# Patient Record
Sex: FEMALE | Race: WHITE | NOT HISPANIC OR LATINO | Employment: OTHER | URBAN - METROPOLITAN AREA
[De-identification: names, ages, dates, MRNs, and addresses within clinical notes are randomized per-mention and may not be internally consistent; named-entity substitution may affect disease eponyms.]

---

## 2017-03-08 PROBLEM — M50.30 DEGENERATIVE DISC DISEASE, CERVICAL: Status: ACTIVE | Noted: 2017-03-08

## 2017-03-08 PROBLEM — J30.89 PERENNIAL ALLERGIC RHINITIS: Status: ACTIVE | Noted: 2017-03-08

## 2017-04-28 PROBLEM — I95.1 ORTHOSTASIS: Status: ACTIVE | Noted: 2017-04-28

## 2017-04-28 PROBLEM — N39.0 UTI (URINARY TRACT INFECTION): Status: ACTIVE | Noted: 2017-04-28

## 2017-04-28 PROBLEM — E86.0 DEHYDRATION: Status: ACTIVE | Noted: 2017-04-28

## 2017-04-28 PROBLEM — R55 SYNCOPE AND COLLAPSE: Status: ACTIVE | Noted: 2017-04-28

## 2017-04-28 PROBLEM — R94.31 PROLONGED Q-T INTERVAL ON ECG: Status: ACTIVE | Noted: 2017-04-28

## 2017-04-28 PROBLEM — E87.1 HYPONATREMIA: Status: ACTIVE | Noted: 2017-04-28

## 2017-04-29 PROBLEM — R00.1 BRADYCARDIA, SINUS: Status: ACTIVE | Noted: 2017-04-29

## 2017-05-05 PROBLEM — E86.0 DEHYDRATION: Status: RESOLVED | Noted: 2017-04-28 | Resolved: 2017-05-05

## 2017-05-05 PROBLEM — N39.0 UTI (URINARY TRACT INFECTION): Status: RESOLVED | Noted: 2017-04-28 | Resolved: 2017-05-05

## 2017-05-05 PROBLEM — R55 SYNCOPE AND COLLAPSE: Status: RESOLVED | Noted: 2017-04-28 | Resolved: 2017-05-05

## 2018-05-16 PROBLEM — D50.8 IRON DEFICIENCY ANEMIA SECONDARY TO INADEQUATE DIETARY IRON INTAKE: Status: ACTIVE | Noted: 2018-05-16

## 2018-05-18 PROBLEM — Z78.0 POSTMENOPAUSAL: Status: ACTIVE | Noted: 2018-05-18

## 2018-11-14 ENCOUNTER — OFFICE VISIT (OUTPATIENT)
Dept: URGENT CARE | Facility: CLINIC | Age: 78
End: 2018-11-14
Payer: MEDICARE

## 2018-11-14 VITALS
SYSTOLIC BLOOD PRESSURE: 147 MMHG | BODY MASS INDEX: 29.64 KG/M2 | TEMPERATURE: 97 F | OXYGEN SATURATION: 97 % | DIASTOLIC BLOOD PRESSURE: 82 MMHG | WEIGHT: 157 LBS | HEIGHT: 61 IN | HEART RATE: 70 BPM | RESPIRATION RATE: 16 BRPM

## 2018-11-14 DIAGNOSIS — S91.119A LACERATION OF SKIN OF TOE, INITIAL ENCOUNTER: Primary | ICD-10-CM

## 2018-11-14 PROCEDURE — 3079F DIAST BP 80-89 MM HG: CPT | Mod: S$GLB,,, | Performed by: FAMILY MEDICINE

## 2018-11-14 PROCEDURE — 3077F SYST BP >= 140 MM HG: CPT | Mod: S$GLB,,, | Performed by: FAMILY MEDICINE

## 2018-11-14 PROCEDURE — 1101F PT FALLS ASSESS-DOCD LE1/YR: CPT | Mod: S$GLB,,, | Performed by: FAMILY MEDICINE

## 2018-11-14 PROCEDURE — 99214 OFFICE O/P EST MOD 30 MIN: CPT | Mod: S$GLB,,, | Performed by: FAMILY MEDICINE

## 2018-11-14 RX ORDER — MUPIROCIN 20 MG/G
OINTMENT TOPICAL
Qty: 22 G | Refills: 1 | Status: SHIPPED | OUTPATIENT
Start: 2018-11-14 | End: 2019-02-01

## 2018-11-14 NOTE — PROCEDURES
Laceration Repair  Date/Time: 11/14/2018 11:15 AM  Performed by: Sergio Howell MD  Authorized by: Sergio Howell MD   Foreign body present: pet hair.  Irrigation solution: saline  Irrigation method: syringe  Amount of cleaning: standard  Skin closure: Steri-Strips  Approximation difficulty: simple  Dressing: 4x4 sterile gauze, Steri-Strips, antibiotic ointment and non-stick sterile dressing  Patient tolerance: Patient tolerated the procedure well with no immediate complications

## 2018-11-14 NOTE — PROGRESS NOTES
"Subjective:       Patient ID: Nidia Alston is a 77 y.o. female.    Vitals:  height is 5' 1" (1.549 m) and weight is 71.2 kg (157 lb). Her temperature is 97.3 °F (36.3 °C). Her blood pressure is 147/82 (abnormal) and her pulse is 70. Her respiration is 16 and oxygen saturation is 97%.     Chief Complaint: Laceration    Pt states she fell this morning and injured her big toe on right foot.      Laceration    The incident occurred 1 to 3 hours ago. The laceration is located on the left foot.     Review of Systems   Constitution: Negative for weakness and malaise/fatigue.   HENT: Negative for nosebleeds.    Cardiovascular: Negative for chest pain and syncope.   Respiratory: Negative for shortness of breath.    Musculoskeletal: Positive for joint pain. Negative for back pain and neck pain.   Gastrointestinal: Negative for abdominal pain.   Genitourinary: Negative for hematuria.   Neurological: Negative for dizziness and numbness.       Objective:      Physical Exam   Constitutional: She is oriented to person, place, and time. She appears well-developed and well-nourished. She is cooperative.  Non-toxic appearance. She does not appear ill. No distress.   HENT:   Head: Normocephalic and atraumatic. Head is without abrasion, without contusion and without laceration.   Right Ear: Hearing, tympanic membrane and ear canal normal. No hemotympanum.   Left Ear: Hearing, tympanic membrane and ear canal normal. No hemotympanum.   Nose: Nose normal. No mucosal edema, rhinorrhea or nasal deformity. No epistaxis. Right sinus exhibits no maxillary sinus tenderness and no frontal sinus tenderness. Left sinus exhibits no maxillary sinus tenderness and no frontal sinus tenderness.   Mouth/Throat: Uvula is midline and mucous membranes are normal. No trismus in the jaw. Normal dentition. No uvula swelling. No posterior oropharyngeal erythema.   Eyes: Conjunctivae, EOM and lids are normal. Right eye exhibits no discharge. Left eye " exhibits no discharge. No scleral icterus.   Sclera clear bilat   Neck: Trachea normal, normal range of motion, full passive range of motion without pain and phonation normal. Neck supple. No spinous process tenderness and no muscular tenderness present. No neck rigidity. No tracheal deviation present.   Cardiovascular: Normal rate, intact distal pulses and normal pulses.   Pulmonary/Chest: Effort normal. No respiratory distress.   Abdominal: Normal appearance. She exhibits no distension, no pulsatile midline mass and no mass. There is no tenderness.   Musculoskeletal: Normal range of motion. She exhibits no edema or deformity.   Neurological: She is alert and oriented to person, place, and time. She has normal strength. No cranial nerve deficit or sensory deficit. She exhibits normal muscle tone. She displays no seizure activity. Coordination normal. GCS eye subscore is 4. GCS verbal subscore is 5. GCS motor subscore is 6.   Skin: Skin is warm and dry. Capillary refill takes less than 2 seconds. Laceration (skin avulsion(partial)of tip of left great toe. ) noted. No abrasion, no bruising, no burn and no ecchymosis noted. She is not diaphoretic. No pallor.        Psychiatric: She has a normal mood and affect. Her speech is normal and behavior is normal. Judgment and thought content normal. Cognition and memory are normal.   Nursing note and vitals reviewed.      Assessment:       1. Laceration of skin of toe, initial encounter        Plan:         Laceration of skin of toe, initial encounter    Other orders  -     mupirocin (BACTROBAN) 2 % ointment; Apply to affected area 3 times daily  Dispense: 22 g; Refill: 1

## 2019-04-26 PROBLEM — R05.8 PRODUCTIVE COUGH: Status: ACTIVE | Noted: 2019-04-26

## 2019-04-26 PROBLEM — R82.90 ABNORMAL URINALYSIS: Status: ACTIVE | Noted: 2019-04-26

## 2019-04-26 PROBLEM — R55 SYNCOPAL EPISODES: Status: ACTIVE | Noted: 2019-04-26

## 2019-04-26 PROBLEM — F12.90 MARIJUANA USE: Status: ACTIVE | Noted: 2019-04-26

## 2019-04-28 PROBLEM — R55 CONVULSIVE SYNCOPE: Status: ACTIVE | Noted: 2019-04-28

## 2019-09-06 PROBLEM — R82.90 ABNORMAL URINALYSIS: Status: RESOLVED | Noted: 2019-04-26 | Resolved: 2019-09-06

## 2019-09-06 PROBLEM — F51.01 PRIMARY INSOMNIA: Status: ACTIVE | Noted: 2019-09-06

## 2019-09-06 PROBLEM — E86.0 DEHYDRATION: Status: RESOLVED | Noted: 2017-04-28 | Resolved: 2019-09-06

## 2019-09-06 PROBLEM — E87.1 HYPONATREMIA: Status: RESOLVED | Noted: 2017-04-28 | Resolved: 2019-09-06

## 2021-01-11 ENCOUNTER — TELEPHONE (OUTPATIENT)
Dept: NEUROSURGERY | Facility: CLINIC | Age: 81
End: 2021-01-11

## 2021-01-11 RX ORDER — OXYCODONE HYDROCHLORIDE 10 MG/1
TABLET ORAL
COMMUNITY
End: 2022-02-03

## 2022-02-03 ENCOUNTER — OFFICE VISIT (OUTPATIENT)
Dept: FAMILY MEDICINE | Facility: CLINIC | Age: 82
End: 2022-02-03
Payer: MEDICARE

## 2022-02-03 VITALS
OXYGEN SATURATION: 97 % | WEIGHT: 162.69 LBS | BODY MASS INDEX: 30.71 KG/M2 | TEMPERATURE: 98 F | SYSTOLIC BLOOD PRESSURE: 128 MMHG | HEART RATE: 66 BPM | DIASTOLIC BLOOD PRESSURE: 74 MMHG | RESPIRATION RATE: 16 BRPM | HEIGHT: 61 IN

## 2022-02-03 DIAGNOSIS — Z13.220 ENCOUNTER FOR LIPID SCREENING FOR CARDIOVASCULAR DISEASE: ICD-10-CM

## 2022-02-03 DIAGNOSIS — E66.9 OBESITY, CLASS I, BMI 30-34.9: ICD-10-CM

## 2022-02-03 DIAGNOSIS — Z13.6 ENCOUNTER FOR LIPID SCREENING FOR CARDIOVASCULAR DISEASE: ICD-10-CM

## 2022-02-03 DIAGNOSIS — K21.9 GASTROESOPHAGEAL REFLUX DISEASE, UNSPECIFIED WHETHER ESOPHAGITIS PRESENT: ICD-10-CM

## 2022-02-03 DIAGNOSIS — I10 ESSENTIAL HYPERTENSION: ICD-10-CM

## 2022-02-03 DIAGNOSIS — N39.3 FEMALE GENUINE STRESS INCONTINENCE: ICD-10-CM

## 2022-02-03 DIAGNOSIS — M50.30 DEGENERATIVE DISC DISEASE, CERVICAL: ICD-10-CM

## 2022-02-03 DIAGNOSIS — F32.A DEPRESSIVE DISORDER: ICD-10-CM

## 2022-02-03 DIAGNOSIS — J30.89 PERENNIAL ALLERGIC RHINITIS: ICD-10-CM

## 2022-02-03 DIAGNOSIS — Z76.89 ENCOUNTER TO ESTABLISH CARE: Primary | ICD-10-CM

## 2022-02-03 PROCEDURE — 99214 OFFICE O/P EST MOD 30 MIN: CPT | Mod: S$GLB,,, | Performed by: FAMILY MEDICINE

## 2022-02-03 PROCEDURE — 1101F PT FALLS ASSESS-DOCD LE1/YR: CPT | Mod: CPTII,S$GLB,, | Performed by: FAMILY MEDICINE

## 2022-02-03 PROCEDURE — 3074F SYST BP LT 130 MM HG: CPT | Mod: CPTII,S$GLB,, | Performed by: FAMILY MEDICINE

## 2022-02-03 PROCEDURE — 3074F PR MOST RECENT SYSTOLIC BLOOD PRESSURE < 130 MM HG: ICD-10-PCS | Mod: CPTII,S$GLB,, | Performed by: FAMILY MEDICINE

## 2022-02-03 PROCEDURE — 1160F PR REVIEW ALL MEDS BY PRESCRIBER/CLIN PHARMACIST DOCUMENTED: ICD-10-PCS | Mod: CPTII,S$GLB,, | Performed by: FAMILY MEDICINE

## 2022-02-03 PROCEDURE — 99999 PR PBB SHADOW E&M-EST. PATIENT-LVL IV: CPT | Mod: PBBFAC,,, | Performed by: FAMILY MEDICINE

## 2022-02-03 PROCEDURE — 3288F PR FALLS RISK ASSESSMENT DOCUMENTED: ICD-10-PCS | Mod: CPTII,S$GLB,, | Performed by: FAMILY MEDICINE

## 2022-02-03 PROCEDURE — 99214 PR OFFICE/OUTPT VISIT, EST, LEVL IV, 30-39 MIN: ICD-10-PCS | Mod: S$GLB,,, | Performed by: FAMILY MEDICINE

## 2022-02-03 PROCEDURE — 3288F FALL RISK ASSESSMENT DOCD: CPT | Mod: CPTII,S$GLB,, | Performed by: FAMILY MEDICINE

## 2022-02-03 PROCEDURE — 3078F PR MOST RECENT DIASTOLIC BLOOD PRESSURE < 80 MM HG: ICD-10-PCS | Mod: CPTII,S$GLB,, | Performed by: FAMILY MEDICINE

## 2022-02-03 PROCEDURE — 3078F DIAST BP <80 MM HG: CPT | Mod: CPTII,S$GLB,, | Performed by: FAMILY MEDICINE

## 2022-02-03 PROCEDURE — 99999 PR PBB SHADOW E&M-EST. PATIENT-LVL IV: ICD-10-PCS | Mod: PBBFAC,,, | Performed by: FAMILY MEDICINE

## 2022-02-03 PROCEDURE — 1126F AMNT PAIN NOTED NONE PRSNT: CPT | Mod: CPTII,S$GLB,, | Performed by: FAMILY MEDICINE

## 2022-02-03 PROCEDURE — 1101F PR PT FALLS ASSESS DOC 0-1 FALLS W/OUT INJ PAST YR: ICD-10-PCS | Mod: CPTII,S$GLB,, | Performed by: FAMILY MEDICINE

## 2022-02-03 PROCEDURE — 1126F PR PAIN SEVERITY QUANTIFIED, NO PAIN PRESENT: ICD-10-PCS | Mod: CPTII,S$GLB,, | Performed by: FAMILY MEDICINE

## 2022-02-03 PROCEDURE — 1159F MED LIST DOCD IN RCRD: CPT | Mod: CPTII,S$GLB,, | Performed by: FAMILY MEDICINE

## 2022-02-03 PROCEDURE — 1160F RVW MEDS BY RX/DR IN RCRD: CPT | Mod: CPTII,S$GLB,, | Performed by: FAMILY MEDICINE

## 2022-02-03 PROCEDURE — 1159F PR MEDICATION LIST DOCUMENTED IN MEDICAL RECORD: ICD-10-PCS | Mod: CPTII,S$GLB,, | Performed by: FAMILY MEDICINE

## 2022-02-03 RX ORDER — ARIPIPRAZOLE 5 MG/1
5 TABLET ORAL DAILY
Qty: 90 TABLET | Refills: 3 | Status: SHIPPED | OUTPATIENT
Start: 2022-02-03 | End: 2022-04-25

## 2022-02-03 RX ORDER — CITALOPRAM 20 MG/1
20 TABLET, FILM COATED ORAL DAILY
Qty: 90 TABLET | Refills: 3 | Status: SHIPPED | OUTPATIENT
Start: 2022-02-03 | End: 2022-04-25

## 2022-02-03 RX ORDER — FLUTICASONE PROPIONATE 50 MCG
SPRAY, SUSPENSION (ML) NASAL
Qty: 16 G | Refills: 5 | Status: SHIPPED | OUTPATIENT
Start: 2022-02-03

## 2022-02-03 RX ORDER — LOSARTAN POTASSIUM 50 MG/1
TABLET ORAL
Qty: 90 TABLET | Refills: 3 | Status: SHIPPED | OUTPATIENT
Start: 2022-02-03 | End: 2022-04-25

## 2022-02-03 RX ORDER — AMLODIPINE BESYLATE 5 MG/1
5 TABLET ORAL DAILY
Qty: 90 TABLET | Refills: 3 | Status: SHIPPED | OUTPATIENT
Start: 2022-02-03 | End: 2022-04-25

## 2022-02-03 NOTE — PROGRESS NOTES
"Subjective:       Patient ID: Nidia Alston is a 81 y.o. female.    Chief Complaint: Establish Care and Medication Refill    This patient is new to me.   Nidia Alston presents to the clinic today to establish care and get medication refills. Patient was previously folowed by Dr Xie in the [facility] clinic. Patient reports she recently moved in with her son and needs to establish with a new PCP in this area. Patient states "I have a bad back and bad bladder". Patient reports history of two bladder surgeries and reports "I am going to leak and there is nothing they can do about it".  Patient states she feels as she is gaining weight due to increased appetite.   Patient has no complaints today but does want to establish with a physician in the near future.   Patient educated on plan of care, verbalized understanding.     Review of Systems   Constitutional: Negative for activity change, appetite change, chills, diaphoresis and fever.   HENT: Negative for congestion, ear pain, postnasal drip, sinus pressure, sneezing and sore throat.    Eyes: Negative for pain, discharge, redness and itching.   Respiratory: Negative for apnea, cough, chest tightness, shortness of breath and wheezing.    Cardiovascular: Negative for chest pain and leg swelling.   Gastrointestinal: Negative for abdominal distention, abdominal pain, constipation, diarrhea, nausea and vomiting.   Genitourinary: Positive for frequency. Negative for difficulty urinating, dysuria and flank pain.   Musculoskeletal: Positive for back pain.   Skin: Negative for color change, rash and wound.   Neurological: Negative for dizziness.       Patient Active Problem List   Diagnosis    Status of breast implant    Depressive disorder    Female genuine stress incontinence    Hypertension    GERD (gastroesophageal reflux disease)    Osteopenia of necks of both femurs    Chronic insomnia    Perennial allergic rhinitis    Degenerative disc " disease, cervical    Prolonged Q-T interval on ECG    Iron deficiency anemia secondary to inadequate dietary iron intake    Syncopal episodes    Marijuana use       Objective:      Physical Exam  Vitals reviewed.   Constitutional:       General: She is not in acute distress.     Appearance: Normal appearance. She is well-developed.   HENT:      Head: Normocephalic.      Nose: Nose normal.   Eyes:      Conjunctiva/sclera: Conjunctivae normal.      Pupils: Pupils are equal, round, and reactive to light.   Cardiovascular:      Rate and Rhythm: Normal rate and regular rhythm.      Heart sounds: Normal heart sounds.   Pulmonary:      Effort: Pulmonary effort is normal. No respiratory distress.      Breath sounds: Normal breath sounds.   Abdominal:      General: Bowel sounds are normal. There is no distension.      Palpations: Abdomen is soft.      Tenderness: There is no abdominal tenderness.   Musculoskeletal:      Cervical back: Normal range of motion and neck supple.   Skin:     General: Skin is warm and dry.      Findings: No rash.   Neurological:      Mental Status: She is alert and oriented to person, place, and time.   Psychiatric:         Behavior: Behavior normal.         Lab Results   Component Value Date    WBC 8.57 01/05/2021    HGB 13.4 01/05/2021    HCT 40.2 01/05/2021     01/05/2021    CHOL 188 04/27/2019    TRIG 94 04/27/2019    HDL 59 04/27/2019    ALT 21 01/05/2021    AST 32 01/05/2021     01/05/2021    K 4.0 01/05/2021     01/05/2021    CREATININE 0.80 01/05/2021    BUN 12 01/05/2021    CO2 23 01/05/2021    TSH 1.830 04/27/2019    INR 1.0 01/05/2021    HGBA1C 5.0 03/27/2019     The ASCVD Risk score (Vidal KIERA Jr., et al., 2013) failed to calculate for the following reasons:    The 2013 ASCVD risk score is only valid for ages 40 to 79  Visit Vitals  /74 (BP Location: Left arm, Patient Position: Sitting, BP Method: Medium (Manual))   Pulse 66   Temp 98.4 °F (36.9 °C) (Oral)  "  Resp 16   Ht 5' 1" (1.549 m)   Wt 73.8 kg (162 lb 11.2 oz)   SpO2 97%   BMI 30.74 kg/m²      Assessment:       1. Encounter to establish care    2. Obesity, Class I, BMI 30-34.9    3. Essential hypertension    4. Depressive disorder    5. Perennial allergic rhinitis    6. Encounter for lipid screening for cardiovascular disease    7. Degenerative disc disease, cervical    8. Female genuine stress incontinence    9. Gastroesophageal reflux disease, unspecified whether esophagitis present        Plan:       Nidia was seen today for establish care and medication refill.    Diagnoses and all orders for this visit:    Encounter to establish care    Obesity, Class I, BMI 30-34.9  Body mass index is 30.74 kg/m².  Continue healthy diet and regular exercise as tolerated.  Continue medications as prescribed.  Follow up with PCP     Essential hypertension  -     amLODIPine (NORVASC) 5 MG tablet; Take 1 tablet (5 mg total) by mouth once daily.  -     losartan (COZAAR) 50 MG tablet; TAKE 1 TABLET BY MOUTH EVERY DAY  -     CBC Auto Differential; Future  -     Comprehensive Metabolic Panel; Future  Stable  Continue medications as prescribed.  Follow up with PCP     Depressive disorder  -     ARIPiprazole (ABILIFY) 5 MG Tab; Take 1 tablet (5 mg total) by mouth once daily.  -     citalopram (CELEXA) 20 MG tablet; Take 1 tablet (20 mg total) by mouth once daily.  Continue medications as prescribed.  Follow up with PCP    Perennial allergic rhinitis  -     fluticasone propionate (FLONASE) 50 mcg/actuation nasal spray; 2 SPRAYS IN EACH NOSTRIL ONCE DAILY  Continue medications as prescribed.  Follow up with PCP    Encounter for lipid screening for cardiovascular disease  -     Lipid Panel; Future    Degenerative disc disease, cervical  Continue medications as prescribed.  Follow up with PCP and back specialist as needed    Female genuine stress incontinence    Gastroesophageal reflux disease, unspecified whether esophagitis " present  Continue medications as prescribed.  Follow up with PCP     Follow up in about 3 months (around 5/3/2022), or if symptoms worsen or fail to improve, for fasting labs prior to next appointment.      Future Appointments     Date Provider Specialty Appt Notes    4/18/2022  Lab fasting labs    4/20/2022 Gege Jenkins MD Family Medicine establish care

## 2022-03-23 ENCOUNTER — TELEPHONE (OUTPATIENT)
Dept: FAMILY MEDICINE | Facility: CLINIC | Age: 82
End: 2022-03-23
Payer: MEDICARE

## 2022-03-23 NOTE — TELEPHONE ENCOUNTER
----- Message from Linda Vamsi sent at 3/23/2022 12:08 PM CDT -----  Type:  Sooner Apoointment Request    Caller is requesting a sooner appointment.  Caller declined first available appointment listed below.  Caller will not accept being placed on the waitlist and is requesting a message be sent to doctor.    Name of Caller:  pt   When is the first available appointment?  4/20   Symptoms:  surgery clearance   Best Call Back Number:  [phone] (home)     Additional Information:  pt states she is already scheduled for an appt on 4/20 but she is having cataract surgery on 4/19 and needing clearance. Please call to discuss.

## 2022-03-23 NOTE — TELEPHONE ENCOUNTER
----- Message from Bk Otero sent at 3/23/2022  5:00 PM CDT -----  Type:  Sooner Apoointment Request    Caller is requesting a sooner appointment.  Caller declined first available appointment listed below.  Caller will not accept being placed on the waitlist and is requesting a message be sent to doctor.    Name of Caller:  PT  When is the first available appointment?  4/20  Symptoms:  see additional  Best Call Back Number:  [phone] (home)     Additional Information:  PT sts she is having a procedure on the 19 and has  a paper that she needs filled out at least a week prior to her procedure that she has to turn in.  Please advise -- Thank you

## 2022-04-01 ENCOUNTER — OFFICE VISIT (OUTPATIENT)
Dept: FAMILY MEDICINE | Facility: CLINIC | Age: 82
End: 2022-04-01
Payer: MEDICARE

## 2022-04-01 VITALS
WEIGHT: 168.19 LBS | TEMPERATURE: 98 F | OXYGEN SATURATION: 95 % | SYSTOLIC BLOOD PRESSURE: 130 MMHG | DIASTOLIC BLOOD PRESSURE: 76 MMHG | BODY MASS INDEX: 31.75 KG/M2 | HEART RATE: 67 BPM | HEIGHT: 61 IN

## 2022-04-01 DIAGNOSIS — R94.31 PROLONGED Q-T INTERVAL ON ECG: Primary | ICD-10-CM

## 2022-04-01 PROBLEM — F12.90 MARIJUANA USE: Status: RESOLVED | Noted: 2019-04-26 | Resolved: 2022-04-01

## 2022-04-01 PROCEDURE — 1160F PR REVIEW ALL MEDS BY PRESCRIBER/CLIN PHARMACIST DOCUMENTED: ICD-10-PCS | Mod: CPTII,S$GLB,, | Performed by: FAMILY MEDICINE

## 2022-04-01 PROCEDURE — 1159F MED LIST DOCD IN RCRD: CPT | Mod: CPTII,S$GLB,, | Performed by: FAMILY MEDICINE

## 2022-04-01 PROCEDURE — 99214 PR OFFICE/OUTPT VISIT, EST, LEVL IV, 30-39 MIN: ICD-10-PCS | Mod: S$GLB,,, | Performed by: FAMILY MEDICINE

## 2022-04-01 PROCEDURE — 1101F PR PT FALLS ASSESS DOC 0-1 FALLS W/OUT INJ PAST YR: ICD-10-PCS | Mod: CPTII,S$GLB,, | Performed by: FAMILY MEDICINE

## 2022-04-01 PROCEDURE — 3288F PR FALLS RISK ASSESSMENT DOCUMENTED: ICD-10-PCS | Mod: CPTII,S$GLB,, | Performed by: FAMILY MEDICINE

## 2022-04-01 PROCEDURE — 93005 ELECTROCARDIOGRAM TRACING: CPT | Mod: S$GLB,,, | Performed by: FAMILY MEDICINE

## 2022-04-01 PROCEDURE — 1126F PR PAIN SEVERITY QUANTIFIED, NO PAIN PRESENT: ICD-10-PCS | Mod: CPTII,S$GLB,, | Performed by: FAMILY MEDICINE

## 2022-04-01 PROCEDURE — 1159F PR MEDICATION LIST DOCUMENTED IN MEDICAL RECORD: ICD-10-PCS | Mod: CPTII,S$GLB,, | Performed by: FAMILY MEDICINE

## 2022-04-01 PROCEDURE — 1101F PT FALLS ASSESS-DOCD LE1/YR: CPT | Mod: CPTII,S$GLB,, | Performed by: FAMILY MEDICINE

## 2022-04-01 PROCEDURE — 99999 PR PBB SHADOW E&M-EST. PATIENT-LVL IV: ICD-10-PCS | Mod: PBBFAC,,, | Performed by: FAMILY MEDICINE

## 2022-04-01 PROCEDURE — 99999 PR PBB SHADOW E&M-EST. PATIENT-LVL IV: CPT | Mod: PBBFAC,,, | Performed by: FAMILY MEDICINE

## 2022-04-01 PROCEDURE — 93005 EKG 12-LEAD: ICD-10-PCS | Mod: S$GLB,,, | Performed by: FAMILY MEDICINE

## 2022-04-01 PROCEDURE — 99214 OFFICE O/P EST MOD 30 MIN: CPT | Mod: S$GLB,,, | Performed by: FAMILY MEDICINE

## 2022-04-01 PROCEDURE — 1160F RVW MEDS BY RX/DR IN RCRD: CPT | Mod: CPTII,S$GLB,, | Performed by: FAMILY MEDICINE

## 2022-04-01 PROCEDURE — 93010 EKG 12-LEAD: ICD-10-PCS | Mod: S$GLB,,, | Performed by: INTERNAL MEDICINE

## 2022-04-01 PROCEDURE — 1126F AMNT PAIN NOTED NONE PRSNT: CPT | Mod: CPTII,S$GLB,, | Performed by: FAMILY MEDICINE

## 2022-04-01 PROCEDURE — 3078F DIAST BP <80 MM HG: CPT | Mod: CPTII,S$GLB,, | Performed by: FAMILY MEDICINE

## 2022-04-01 PROCEDURE — 3075F PR MOST RECENT SYSTOLIC BLOOD PRESS GE 130-139MM HG: ICD-10-PCS | Mod: CPTII,S$GLB,, | Performed by: FAMILY MEDICINE

## 2022-04-01 PROCEDURE — 93010 ELECTROCARDIOGRAM REPORT: CPT | Mod: S$GLB,,, | Performed by: INTERNAL MEDICINE

## 2022-04-01 PROCEDURE — 3078F PR MOST RECENT DIASTOLIC BLOOD PRESSURE < 80 MM HG: ICD-10-PCS | Mod: CPTII,S$GLB,, | Performed by: FAMILY MEDICINE

## 2022-04-01 PROCEDURE — 3075F SYST BP GE 130 - 139MM HG: CPT | Mod: CPTII,S$GLB,, | Performed by: FAMILY MEDICINE

## 2022-04-01 PROCEDURE — 3288F FALL RISK ASSESSMENT DOCD: CPT | Mod: CPTII,S$GLB,, | Performed by: FAMILY MEDICINE

## 2022-04-01 NOTE — LETTER
April 1, 2022        Kristin Gruber MD  [address]  [address]  [facility]  [facility] [address]             [facility]  [address]  [place] [address]  Phone: [phone]  Fax: [phone]   Patient: Nidia Alston   MR Number: [id number]   YOB: 1940   Date of Visit: 4/1/2022       Dear Dr. Gruber:    Thank you for referring Nidia Alston to me for evaluation. Below are the relevant portions of my assessment and plan of care.    81 y.o. female with planned surgery as above.    Known risk factors for perioperative complications: None       Patient is low risk for this low risk procedure and should proceed with surgery      1. No additional work up needed. Patient is clear for cataract surgery.       If you have questions, please do not hesitate to call me. I look forward to following Nidia along with you.    Sincerely,      Radha Olson MD

## 2022-04-01 NOTE — PROGRESS NOTES
Subjective:      Nidia Alston is a 81 y.o. female who presents to the office today for a preoperative consultation at the request of surgeon Dr. Gruber who plans on performing cataract surgery on April 19. This consultation is requested for the specific conditions prompting preoperative evaluation (i.e. because of potential affect on operative risk): age, hypertension, QT prolongation. Planned anesthesia: IV sedation. The patient has the following known anesthesia issues: None. Patients bleeding risk: use of Ca-channel blockers (see med list). This is however a bloodless procedure and this should not matter.  The following portions of the patient's history were reviewed and updated as appropriate: allergies, current medications, past family history, past medical history, past social history, past surgical history and problem list.    Review of Systems  Pertinent items are noted in HPI.?      Objective:       Physical Exam  Vitals and nursing note reviewed.   Constitutional:       General: She is not in acute distress.     Appearance: She is not ill-appearing.   Cardiovascular:      Rate and Rhythm: Normal rate and regular rhythm.      Heart sounds: Murmur (systolic) heard.   Pulmonary:      Effort: Pulmonary effort is normal.      Breath sounds: Normal breath sounds. No wheezing.   Skin:     General: Skin is warm and dry.      Findings: No rash.   Neurological:      Mental Status: She is alert.   Psychiatric:         Mood and Affect: Mood normal.         Behavior: Behavior normal.         Cardiographics  ECG: previous EKG with prolonged QT; Much improved today at 441.   Echocardiogram: last done April 2017; EF 55-60%          Assessment:        81 y.o. female with planned surgery as above.    Known risk factors for perioperative complications: None       Patient is low risk for this low risk procedure and should proceed with surgery          Plan:      1. No additional work up needed. Patient is clear for  cataract surgery.

## 2022-04-18 ENCOUNTER — LAB VISIT (OUTPATIENT)
Dept: LAB | Facility: CLINIC | Age: 82
End: 2022-04-18
Payer: MEDICARE

## 2022-04-18 DIAGNOSIS — Z13.6 ENCOUNTER FOR LIPID SCREENING FOR CARDIOVASCULAR DISEASE: ICD-10-CM

## 2022-04-18 DIAGNOSIS — I10 ESSENTIAL HYPERTENSION: ICD-10-CM

## 2022-04-18 DIAGNOSIS — Z13.220 ENCOUNTER FOR LIPID SCREENING FOR CARDIOVASCULAR DISEASE: ICD-10-CM

## 2022-04-18 LAB
ALBUMIN SERPL BCP-MCNC: 4.1 G/DL (ref 3.5–5.2)
ALP SERPL-CCNC: 81 U/L (ref 55–135)
ALT SERPL W/O P-5'-P-CCNC: 16 U/L (ref 10–44)
ANION GAP SERPL CALC-SCNC: 12 MMOL/L (ref 8–16)
AST SERPL-CCNC: 18 U/L (ref 10–40)
BASOPHILS # BLD AUTO: 0.05 K/UL (ref 0–0.2)
BASOPHILS NFR BLD: 0.8 % (ref 0–1.9)
BILIRUB SERPL-MCNC: 0.8 MG/DL (ref 0.1–1)
BUN SERPL-MCNC: 16 MG/DL (ref 8–23)
CALCIUM SERPL-MCNC: 9.7 MG/DL (ref 8.7–10.5)
CHLORIDE SERPL-SCNC: 106 MMOL/L (ref 95–110)
CHOLEST SERPL-MCNC: 234 MG/DL (ref 120–199)
CHOLEST/HDLC SERPL: 3.5 {RATIO} (ref 2–5)
CO2 SERPL-SCNC: 24 MMOL/L (ref 23–29)
CREAT SERPL-MCNC: 0.8 MG/DL (ref 0.5–1.4)
DIFFERENTIAL METHOD: ABNORMAL
EOSINOPHIL # BLD AUTO: 0.3 K/UL (ref 0–0.5)
EOSINOPHIL NFR BLD: 5.4 % (ref 0–8)
ERYTHROCYTE [DISTWIDTH] IN BLOOD BY AUTOMATED COUNT: 12.4 % (ref 11.5–14.5)
EST. GFR  (AFRICAN AMERICAN): >60 ML/MIN/1.73 M^2
EST. GFR  (NON AFRICAN AMERICAN): >60 ML/MIN/1.73 M^2
GLUCOSE SERPL-MCNC: 90 MG/DL (ref 70–110)
HCT VFR BLD AUTO: 44.3 % (ref 37–48.5)
HDLC SERPL-MCNC: 67 MG/DL (ref 40–75)
HDLC SERPL: 28.6 % (ref 20–50)
HGB BLD-MCNC: 14 G/DL (ref 12–16)
IMM GRANULOCYTES # BLD AUTO: 0.02 K/UL (ref 0–0.04)
IMM GRANULOCYTES NFR BLD AUTO: 0.3 % (ref 0–0.5)
LDLC SERPL CALC-MCNC: 134.4 MG/DL (ref 63–159)
LYMPHOCYTES # BLD AUTO: 2.6 K/UL (ref 1–4.8)
LYMPHOCYTES NFR BLD: 42.8 % (ref 18–48)
MCH RBC QN AUTO: 29.7 PG (ref 27–31)
MCHC RBC AUTO-ENTMCNC: 31.6 G/DL (ref 32–36)
MCV RBC AUTO: 94 FL (ref 82–98)
MONOCYTES # BLD AUTO: 0.5 K/UL (ref 0.3–1)
MONOCYTES NFR BLD: 8.1 % (ref 4–15)
NEUTROPHILS # BLD AUTO: 2.5 K/UL (ref 1.8–7.7)
NEUTROPHILS NFR BLD: 42.6 % (ref 38–73)
NONHDLC SERPL-MCNC: 167 MG/DL
NRBC BLD-RTO: 0 /100 WBC
PLATELET # BLD AUTO: 290 K/UL (ref 150–450)
PMV BLD AUTO: 9.8 FL (ref 9.2–12.9)
POTASSIUM SERPL-SCNC: 4.2 MMOL/L (ref 3.5–5.1)
PROT SERPL-MCNC: 7.3 G/DL (ref 6–8.4)
RBC # BLD AUTO: 4.72 M/UL (ref 4–5.4)
SODIUM SERPL-SCNC: 142 MMOL/L (ref 136–145)
TRIGL SERPL-MCNC: 163 MG/DL (ref 30–150)
WBC # BLD AUTO: 5.96 K/UL (ref 3.9–12.7)

## 2022-04-18 PROCEDURE — 36415 PR COLLECTION VENOUS BLOOD,VENIPUNCTURE: ICD-10-PCS | Mod: PN,,, | Performed by: FAMILY MEDICINE

## 2022-04-18 PROCEDURE — 36415 COLL VENOUS BLD VENIPUNCTURE: CPT | Mod: PN,,, | Performed by: FAMILY MEDICINE

## 2022-04-18 PROCEDURE — 80061 LIPID PANEL: CPT | Performed by: FAMILY MEDICINE

## 2022-04-18 PROCEDURE — 80053 COMPREHEN METABOLIC PANEL: CPT | Performed by: FAMILY MEDICINE

## 2022-04-18 PROCEDURE — 85025 COMPLETE CBC W/AUTO DIFF WBC: CPT | Performed by: FAMILY MEDICINE

## 2022-07-22 ENCOUNTER — OFFICE VISIT (OUTPATIENT)
Dept: FAMILY MEDICINE | Facility: CLINIC | Age: 82
End: 2022-07-22
Payer: MEDICARE

## 2022-07-22 VITALS
OXYGEN SATURATION: 94 % | WEIGHT: 168.19 LBS | SYSTOLIC BLOOD PRESSURE: 130 MMHG | DIASTOLIC BLOOD PRESSURE: 80 MMHG | TEMPERATURE: 98 F | HEART RATE: 79 BPM | HEIGHT: 61 IN | RESPIRATION RATE: 16 BRPM | BODY MASS INDEX: 31.75 KG/M2

## 2022-07-22 DIAGNOSIS — E66.9 OBESITY, CLASS I, BMI 30-34.9: ICD-10-CM

## 2022-07-22 DIAGNOSIS — I10 PRIMARY HYPERTENSION: ICD-10-CM

## 2022-07-22 DIAGNOSIS — N30.01 ACUTE CYSTITIS WITH HEMATURIA: ICD-10-CM

## 2022-07-22 DIAGNOSIS — R30.0 DYSURIA: Primary | ICD-10-CM

## 2022-07-22 LAB
BACTERIA #/AREA URNS HPF: ABNORMAL /HPF
BILIRUB SERPL-MCNC: NEGATIVE MG/DL
BLOOD URINE, POC: ABNORMAL
CLARITY, POC UA: ABNORMAL
COLOR, POC UA: ABNORMAL
GLUCOSE UR QL STRIP: ABNORMAL
KETONES UR QL STRIP: NEGATIVE
LEUKOCYTE ESTERASE URINE, POC: ABNORMAL
MICROSCOPIC COMMENT: ABNORMAL
NITRITE, POC UA: POSITIVE
PH, POC UA: 5
PROTEIN, POC: 30
RBC #/AREA URNS HPF: 2 /HPF (ref 0–4)
SPECIFIC GRAVITY, POC UA: 1
SQUAMOUS #/AREA URNS HPF: 1 /HPF
UROBILINOGEN, POC UA: 2
WBC #/AREA URNS HPF: >100 /HPF (ref 0–5)

## 2022-07-22 PROCEDURE — 1126F AMNT PAIN NOTED NONE PRSNT: CPT | Mod: CPTII,S$GLB,, | Performed by: FAMILY MEDICINE

## 2022-07-22 PROCEDURE — 87086 URINE CULTURE/COLONY COUNT: CPT | Performed by: FAMILY MEDICINE

## 2022-07-22 PROCEDURE — 99214 OFFICE O/P EST MOD 30 MIN: CPT | Mod: S$GLB,,, | Performed by: FAMILY MEDICINE

## 2022-07-22 PROCEDURE — 3288F PR FALLS RISK ASSESSMENT DOCUMENTED: ICD-10-PCS | Mod: CPTII,S$GLB,, | Performed by: FAMILY MEDICINE

## 2022-07-22 PROCEDURE — 81002 POCT URINE DIPSTICK WITHOUT MICROSCOPE: ICD-10-PCS | Mod: S$GLB,,, | Performed by: FAMILY MEDICINE

## 2022-07-22 PROCEDURE — 81000 URINALYSIS NONAUTO W/SCOPE: CPT | Performed by: FAMILY MEDICINE

## 2022-07-22 PROCEDURE — 99999 PR PBB SHADOW E&M-EST. PATIENT-LVL IV: CPT | Mod: PBBFAC,,, | Performed by: FAMILY MEDICINE

## 2022-07-22 PROCEDURE — 99999 PR PBB SHADOW E&M-EST. PATIENT-LVL IV: ICD-10-PCS | Mod: PBBFAC,,, | Performed by: FAMILY MEDICINE

## 2022-07-22 PROCEDURE — 3288F FALL RISK ASSESSMENT DOCD: CPT | Mod: CPTII,S$GLB,, | Performed by: FAMILY MEDICINE

## 2022-07-22 PROCEDURE — 99214 PR OFFICE/OUTPT VISIT, EST, LEVL IV, 30-39 MIN: ICD-10-PCS | Mod: S$GLB,,, | Performed by: FAMILY MEDICINE

## 2022-07-22 PROCEDURE — 1160F PR REVIEW ALL MEDS BY PRESCRIBER/CLIN PHARMACIST DOCUMENTED: ICD-10-PCS | Mod: CPTII,S$GLB,, | Performed by: FAMILY MEDICINE

## 2022-07-22 PROCEDURE — 1126F PR PAIN SEVERITY QUANTIFIED, NO PAIN PRESENT: ICD-10-PCS | Mod: CPTII,S$GLB,, | Performed by: FAMILY MEDICINE

## 2022-07-22 PROCEDURE — 1101F PR PT FALLS ASSESS DOC 0-1 FALLS W/OUT INJ PAST YR: ICD-10-PCS | Mod: CPTII,S$GLB,, | Performed by: FAMILY MEDICINE

## 2022-07-22 PROCEDURE — 3075F SYST BP GE 130 - 139MM HG: CPT | Mod: CPTII,S$GLB,, | Performed by: FAMILY MEDICINE

## 2022-07-22 PROCEDURE — 3075F PR MOST RECENT SYSTOLIC BLOOD PRESS GE 130-139MM HG: ICD-10-PCS | Mod: CPTII,S$GLB,, | Performed by: FAMILY MEDICINE

## 2022-07-22 PROCEDURE — 1159F MED LIST DOCD IN RCRD: CPT | Mod: CPTII,S$GLB,, | Performed by: FAMILY MEDICINE

## 2022-07-22 PROCEDURE — 3079F PR MOST RECENT DIASTOLIC BLOOD PRESSURE 80-89 MM HG: ICD-10-PCS | Mod: CPTII,S$GLB,, | Performed by: FAMILY MEDICINE

## 2022-07-22 PROCEDURE — 1159F PR MEDICATION LIST DOCUMENTED IN MEDICAL RECORD: ICD-10-PCS | Mod: CPTII,S$GLB,, | Performed by: FAMILY MEDICINE

## 2022-07-22 PROCEDURE — 1160F RVW MEDS BY RX/DR IN RCRD: CPT | Mod: CPTII,S$GLB,, | Performed by: FAMILY MEDICINE

## 2022-07-22 PROCEDURE — 1101F PT FALLS ASSESS-DOCD LE1/YR: CPT | Mod: CPTII,S$GLB,, | Performed by: FAMILY MEDICINE

## 2022-07-22 PROCEDURE — 81002 URINALYSIS NONAUTO W/O SCOPE: CPT | Mod: S$GLB,,, | Performed by: FAMILY MEDICINE

## 2022-07-22 PROCEDURE — 3079F DIAST BP 80-89 MM HG: CPT | Mod: CPTII,S$GLB,, | Performed by: FAMILY MEDICINE

## 2022-07-22 RX ORDER — NITROFURANTOIN 25; 75 MG/1; MG/1
100 CAPSULE ORAL 2 TIMES DAILY
Qty: 14 CAPSULE | Refills: 0 | Status: SHIPPED | OUTPATIENT
Start: 2022-07-22 | End: 2022-07-29

## 2022-07-22 NOTE — PROGRESS NOTES
Subjective:       Patient ID: Nidia Alston is a 81 y.o. female.    Chief Complaint: Urinary Tract Infection (Pt states since Tuesday she has been having burning and frequent urination. /PT reports taking OTC azos til appointment with Ben)    Nidia Alston presents to the clinic today with complaints of a possible UTI. Patient states symptoms started on Tuesday and she has been taking over the counter Azo for this.   Patient educated on plan of care, verbalized understanding.     Urinary Tract Infection   This is a new problem. The current episode started in the past 7 days. The problem occurs every urination. The problem has been unchanged. The quality of the pain is described as burning. The pain is moderate. There has been no fever. Associated symptoms include frequency. Pertinent negatives include no chills, flank pain, nausea, vomiting, constipation or rash.     Review of Systems   Constitutional: Negative for activity change, appetite change, chills, diaphoresis and fever.   HENT: Negative for congestion, ear pain, postnasal drip, sinus pressure, sneezing and sore throat.    Eyes: Negative for pain, discharge, redness and itching.   Respiratory: Negative for apnea, cough, chest tightness, shortness of breath and wheezing.    Cardiovascular: Negative for chest pain and leg swelling.   Gastrointestinal: Negative for abdominal distention, abdominal pain, constipation, diarrhea, nausea and vomiting.   Genitourinary: Positive for dysuria and frequency. Negative for difficulty urinating and flank pain.   Skin: Negative for color change, rash and wound.   Neurological: Negative for dizziness.       Patient Active Problem List   Diagnosis    Status of breast implant    Depressive disorder    Female genuine stress incontinence    Hypertension    GERD (gastroesophageal reflux disease)    Osteopenia of necks of both femurs    Chronic insomnia    Perennial allergic rhinitis    Degenerative disc  "disease, cervical    Prolonged Q-T interval on ECG    Iron deficiency anemia secondary to inadequate dietary iron intake    Syncopal episodes       Objective:      Physical Exam  Vitals reviewed.   Constitutional:       General: She is not in acute distress.     Appearance: Normal appearance. She is well-developed.   HENT:      Head: Normocephalic.      Nose: Nose normal.   Eyes:      Conjunctiva/sclera: Conjunctivae normal.      Pupils: Pupils are equal, round, and reactive to light.   Pulmonary:      Effort: Pulmonary effort is normal. No respiratory distress.   Abdominal:      Tenderness: There is no right CVA tenderness or left CVA tenderness.   Musculoskeletal:      Cervical back: Normal range of motion and neck supple.   Skin:     General: Skin is warm and dry.      Findings: No rash.   Neurological:      Mental Status: She is alert and oriented to person, place, and time.   Psychiatric:         Mood and Affect: Mood normal.         Behavior: Behavior normal.         Lab Results   Component Value Date    WBC 5.96 04/18/2022    HGB 14.0 04/18/2022    HCT 44.3 04/18/2022     04/18/2022    CHOL 234 (H) 04/18/2022    TRIG 163 (H) 04/18/2022    HDL 67 04/18/2022    ALT 16 04/18/2022    AST 18 04/18/2022     04/18/2022    K 4.2 04/18/2022     04/18/2022    CREATININE 0.8 04/18/2022    BUN 16 04/18/2022    CO2 24 04/18/2022    TSH 1.830 04/27/2019    INR 1.0 01/05/2021    HGBA1C 5.0 03/27/2019     The ASCVD Risk score ([place] Jr., et al., 2013) failed to calculate for the following reasons:    The 2013 ASCVD risk score is only valid for ages 40 to 79  Visit Vitals  /80 (BP Location: Right arm, Patient Position: Sitting, BP Method: Medium (Manual))   Pulse 79   Temp 98.4 °F (36.9 °C) (Oral)   Resp 16   Ht 5' 1" (1.549 m)   Wt 76.3 kg (168 lb 3.4 oz)   SpO2 (!) 94%   BMI 31.78 kg/m²      Assessment:       1. Dysuria    2. Acute cystitis with hematuria    3. Obesity, Class I, BMI 30-34.9  "   4. Primary hypertension        Plan:       1. Dysuria / Acute cystitis with hematuria  -     POCT URINE DIPSTICK WITHOUT MICROSCOPE  -     Urine culture  -     Urinalysis Microscopic  -     nitrofurantoin, macrocrystal-monohydrate, (MACROBID) 100 MG capsule    2.  Obesity, Class I, BMI 30-34.9  Body mass index is 31.78 kg/m².  Continue healthy diet and regular exercise as tolerated.  Continue medications as prescribed.  Follow up with PCP     3. Primary hypertension  Stable  Continue medications as prescribed.  Follow up with PCP      Follow up if symptoms worsen or fail to improve.

## 2022-07-24 LAB — BACTERIA UR CULT: NORMAL

## 2022-12-14 ENCOUNTER — TELEPHONE (OUTPATIENT)
Dept: FAMILY MEDICINE | Facility: CLINIC | Age: 82
End: 2022-12-14
Payer: MEDICARE

## 2022-12-14 NOTE — TELEPHONE ENCOUNTER
----- Message from Silvia Torrez sent at 12/14/2022  8:51 AM CST -----  Contact: Patient  Type:  Needs Medical Advice    Who Called: Patient    Symptoms (please be specific): Bladder infection      How long has patient had these symptoms:  2 days     Pharmacy name and phone #:    [id number] - [place], [place] - [address] AT [facility] [address] [facility] [address]  [address]  [place] [address]  Phone: [phone] Fax: [phone]      Would the patient rather a call back or a response via [facility]? Call    Best Call Back Number: [phone] (home)     Additional Information: Patient states that she called this morning and that someone was supposed to call in medication for a bladder infection for her. Patient is calling again to check on the status of the medication being called in. Please call to advise

## 2022-12-14 NOTE — TELEPHONE ENCOUNTER
Patient did seem confused and alarmed when I told her that she was last seen in July and medication had not been called in as of today for her infection ongoing. I advised her to go to an urgent care immediately (As I know UTI's can cause delirium and confusion and not to sit on this matter.) She verbalized that she would. I will check on her later today to see if she followed my instructions. Let me know of any other help I can assist with in her care.       -Maye Coburn MA

## 2022-12-14 NOTE — TELEPHONE ENCOUNTER
I have not seen her since July so she will need an appointment for this. If there is no appointemtns available in a timely fashion I suggest she go to urgent care to get this taken care of as soon as possible.

## 2022-12-14 NOTE — TELEPHONE ENCOUNTER
Pt has UTI and stated this morning that provider sent medication over to her local pharmacy however I do not see this in her chart please advise.

## 2023-02-14 ENCOUNTER — OFFICE VISIT (OUTPATIENT)
Dept: FAMILY MEDICINE | Facility: CLINIC | Age: 83
End: 2023-02-14
Payer: MEDICARE

## 2023-02-14 VITALS
BODY MASS INDEX: 29.3 KG/M2 | DIASTOLIC BLOOD PRESSURE: 70 MMHG | WEIGHT: 155.19 LBS | OXYGEN SATURATION: 97 % | SYSTOLIC BLOOD PRESSURE: 116 MMHG | HEART RATE: 78 BPM | HEIGHT: 61 IN

## 2023-02-14 DIAGNOSIS — I10 ESSENTIAL HYPERTENSION: ICD-10-CM

## 2023-02-14 DIAGNOSIS — J30.89 PERENNIAL ALLERGIC RHINITIS: Primary | ICD-10-CM

## 2023-02-14 DIAGNOSIS — R05.3 CHRONIC COUGH: ICD-10-CM

## 2023-02-14 DIAGNOSIS — F32.A DEPRESSIVE DISORDER: ICD-10-CM

## 2023-02-14 PROCEDURE — 3078F PR MOST RECENT DIASTOLIC BLOOD PRESSURE < 80 MM HG: ICD-10-PCS | Mod: CPTII,,,

## 2023-02-14 PROCEDURE — 3078F DIAST BP <80 MM HG: CPT | Mod: CPTII,,,

## 2023-02-14 PROCEDURE — 99214 OFFICE O/P EST MOD 30 MIN: CPT | Mod: ,,,

## 2023-02-14 PROCEDURE — 1126F AMNT PAIN NOTED NONE PRSNT: CPT | Mod: CPTII,,,

## 2023-02-14 PROCEDURE — 1126F PR PAIN SEVERITY QUANTIFIED, NO PAIN PRESENT: ICD-10-PCS | Mod: CPTII,,,

## 2023-02-14 PROCEDURE — 3288F PR FALLS RISK ASSESSMENT DOCUMENTED: ICD-10-PCS | Mod: CPTII,,,

## 2023-02-14 PROCEDURE — 1159F PR MEDICATION LIST DOCUMENTED IN MEDICAL RECORD: ICD-10-PCS | Mod: CPTII,,,

## 2023-02-14 PROCEDURE — 1101F PT FALLS ASSESS-DOCD LE1/YR: CPT | Mod: CPTII,,,

## 2023-02-14 PROCEDURE — 1101F PR PT FALLS ASSESS DOC 0-1 FALLS W/OUT INJ PAST YR: ICD-10-PCS | Mod: CPTII,,,

## 2023-02-14 PROCEDURE — 3288F FALL RISK ASSESSMENT DOCD: CPT | Mod: CPTII,,,

## 2023-02-14 PROCEDURE — 3074F PR MOST RECENT SYSTOLIC BLOOD PRESSURE < 130 MM HG: ICD-10-PCS | Mod: CPTII,,,

## 2023-02-14 PROCEDURE — 1160F RVW MEDS BY RX/DR IN RCRD: CPT | Mod: CPTII,,,

## 2023-02-14 PROCEDURE — 99214 PR OFFICE/OUTPT VISIT, EST, LEVL IV, 30-39 MIN: ICD-10-PCS | Mod: ,,,

## 2023-02-14 PROCEDURE — 3074F SYST BP LT 130 MM HG: CPT | Mod: CPTII,,,

## 2023-02-14 PROCEDURE — 1159F MED LIST DOCD IN RCRD: CPT | Mod: CPTII,,,

## 2023-02-14 PROCEDURE — 1160F PR REVIEW ALL MEDS BY PRESCRIBER/CLIN PHARMACIST DOCUMENTED: ICD-10-PCS | Mod: CPTII,,,

## 2023-02-14 RX ORDER — LOSARTAN POTASSIUM 50 MG/1
TABLET ORAL
Qty: 90 TABLET | Refills: 3 | Status: SHIPPED | OUTPATIENT
Start: 2023-02-14 | End: 2023-12-19 | Stop reason: SDUPTHER

## 2023-02-14 RX ORDER — LEVOCETIRIZINE DIHYDROCHLORIDE 5 MG/1
5 TABLET, FILM COATED ORAL NIGHTLY
Qty: 90 TABLET | Refills: 1 | Status: SHIPPED | OUTPATIENT
Start: 2023-02-14 | End: 2023-07-26

## 2023-02-14 RX ORDER — CITALOPRAM 20 MG/1
20 TABLET, FILM COATED ORAL DAILY
Qty: 90 TABLET | Refills: 3 | Status: SHIPPED | OUTPATIENT
Start: 2023-02-14 | End: 2023-12-19 | Stop reason: SDUPTHER

## 2023-02-14 RX ORDER — ARIPIPRAZOLE 5 MG/1
5 TABLET ORAL DAILY
Qty: 90 TABLET | Refills: 3 | Status: SHIPPED | OUTPATIENT
Start: 2023-02-14 | End: 2024-01-22

## 2023-02-14 RX ORDER — AMLODIPINE BESYLATE 5 MG/1
5 TABLET ORAL DAILY
Qty: 90 TABLET | Refills: 3 | Status: SHIPPED | OUTPATIENT
Start: 2023-02-14 | End: 2023-12-19 | Stop reason: SDUPTHER

## 2023-02-14 NOTE — PROGRESS NOTES
Subjective:       Patient ID: Nidia Alston is a 82 y.o. female.    Chief Complaint: Hypertension (Taking her medication as directed w/o any problems or concerns ), Establish Care, Depression (Controlled with her current medication.), and Cough (Ongoing for two years now. Waking her up at h.s. denies any fever, wheezing , sob.)    Patient presents to the clinic for medication refills.     Patient Active Problem List:     Status of breast implant     Depressive disorder     Female genuine stress incontinence     Hypertension     GERD (gastroesophageal reflux disease)     Osteopenia of necks of both femurs     Chronic insomnia     Perennial allergic rhinitis     Degenerative disc disease, cervical     Prolonged Q-T interval on ECG     Iron deficiency anemia secondary to inadequate dietary iron intake     Syncopal episodes    Hypertension-  BP Readings from Last 3 Encounters:  02/14/23 : 116/70  07/22/22 : 130/80  04/01/22 : 130/76  Taking Losartan and Norvasc as prescribed.     Depression- well controlled on current plan of care- Taking Abilify and Celexa.     Has complaint of an aggravating cough. Been present going on 2+ years. States cough is productive with pale yellow sputum. States she coughs during the day and night. States sometimes it will wake her up at night. She also has complaint of runny nose.     Has no other complaints or concerns today.     Patient educated on plan of care, verbalized understanding.        Review of Systems   Constitutional:  Negative for activity change, appetite change, chills, diaphoresis and fever.   HENT:  Positive for rhinorrhea. Negative for congestion, ear pain, postnasal drip, sinus pressure, sneezing and sore throat.    Eyes:  Negative for pain, discharge, redness and itching.   Respiratory:  Positive for cough. Negative for apnea, chest tightness, shortness of breath and wheezing.    Cardiovascular:  Negative for chest pain and leg swelling.   Gastrointestinal:   Negative for abdominal distention, abdominal pain, constipation, diarrhea, nausea and vomiting.   Genitourinary:  Negative for difficulty urinating, dysuria, flank pain and frequency.   Musculoskeletal:  Positive for arthralgias, back pain and myalgias.   Skin:  Negative for color change, rash and wound.   Neurological:  Negative for dizziness.   All other systems reviewed and are negative.    Patient Active Problem List   Diagnosis    Status of breast implant    Depressive disorder    Female genuine stress incontinence    Hypertension    GERD (gastroesophageal reflux disease)    Osteopenia of necks of both femurs    Chronic insomnia    Perennial allergic rhinitis    Degenerative disc disease, cervical    Prolonged Q-T interval on ECG    Iron deficiency anemia secondary to inadequate dietary iron intake    Syncopal episodes       Objective:      Physical Exam  Vitals and nursing note reviewed.   Constitutional:       General: She is not in acute distress.     Appearance: Normal appearance. She is well-developed.   HENT:      Head: Normocephalic.      Nose: Nose normal.   Eyes:      Conjunctiva/sclera: Conjunctivae normal.      Pupils: Pupils are equal, round, and reactive to light.   Cardiovascular:      Rate and Rhythm: Normal rate and regular rhythm.      Heart sounds: Normal heart sounds.   Pulmonary:      Effort: Pulmonary effort is normal. No respiratory distress.      Breath sounds: Normal breath sounds.   Musculoskeletal:      Cervical back: Normal range of motion and neck supple.   Skin:     General: Skin is warm and dry.      Findings: No rash.   Neurological:      Mental Status: She is alert and oriented to person, place, and time.   Psychiatric:         Behavior: Behavior normal.       Lab Results   Component Value Date    WBC 5.96 04/18/2022    HGB 14.0 04/18/2022    HCT 44.3 04/18/2022     04/18/2022    CHOL 234 (H) 04/18/2022    TRIG 163 (H) 04/18/2022    HDL 67 04/18/2022    ALT 16 04/18/2022  "   AST 18 04/18/2022     04/18/2022    K 4.2 04/18/2022     04/18/2022    CREATININE 0.8 04/18/2022    BUN 16 04/18/2022    CO2 24 04/18/2022    TSH 1.830 04/27/2019    INR 1.0 01/05/2021    HGBA1C 5.0 03/27/2019     The ASCVD Risk score (Holden DK, et al., 2019) failed to calculate for the following reasons:    The 2019 ASCVD risk score is only valid for ages 40 to 79  Visit Vitals  /70 (BP Location: Left arm, Patient Position: Sitting, BP Method: Medium (Manual))   Pulse 78   Ht 5' 1" (1.549 m)   Wt 70.4 kg (155 lb 3.3 oz)   SpO2 97%   BMI 29.33 kg/m²      Assessment:       1. Perennial allergic rhinitis    2. Essential hypertension    3. Depressive disorder    4. Chronic cough          Plan:       1. Perennial allergic rhinitis/Chronic cough  -     levocetirizine (XYZAL) 5 MG tablet; Take 1 tablet (5 mg total) by mouth every evening.  Dispense: 90 tablet; Refill: 1    2. Essential hypertension  -     amLODIPine (NORVASC) 5 MG tablet; Take 1 tablet (5 mg total) by mouth once daily.  Dispense: 90 tablet; Refill: 3  -     losartan (COZAAR) 50 MG tablet; TAKE 1 TABLET EVERY DAY  FOR  HYPERTENSIOMN  Dispense: 90 tablet; Refill: 3   - Stable   - Continue current plan of care    3. Depressive disorder  -     ARIPiprazole (ABILIFY) 5 MG Tab; Take 1 tablet (5 mg total) by mouth once daily.  Dispense: 90 tablet; Refill: 3  -     citalopram (CELEXA) 20 MG tablet; Take 1 tablet (20 mg total) by mouth once daily.  Dispense: 90 tablet; Refill: 3   - Stable    - Continue current plan of care           Follow up in about 3 months (around 5/14/2023), or if symptoms worsen or fail to improve.           "

## 2023-02-14 NOTE — PATIENT INSTRUCTIONS

## 2023-06-29 ENCOUNTER — TELEPHONE (OUTPATIENT)
Dept: FAMILY MEDICINE | Facility: CLINIC | Age: 83
End: 2023-06-29
Payer: MEDICARE

## 2023-07-25 DIAGNOSIS — J30.89 PERENNIAL ALLERGIC RHINITIS: ICD-10-CM

## 2023-07-26 RX ORDER — LEVOCETIRIZINE DIHYDROCHLORIDE 5 MG/1
TABLET, FILM COATED ORAL
Qty: 90 TABLET | Refills: 1 | Status: SHIPPED | OUTPATIENT
Start: 2023-07-26

## 2023-08-11 ENCOUNTER — OFFICE VISIT (OUTPATIENT)
Dept: FAMILY MEDICINE | Facility: CLINIC | Age: 83
End: 2023-08-11
Payer: MEDICARE

## 2023-08-11 VITALS
WEIGHT: 152.75 LBS | BODY MASS INDEX: 28.84 KG/M2 | HEIGHT: 61 IN | DIASTOLIC BLOOD PRESSURE: 62 MMHG | TEMPERATURE: 96 F | SYSTOLIC BLOOD PRESSURE: 126 MMHG | OXYGEN SATURATION: 96 % | HEART RATE: 78 BPM

## 2023-08-11 DIAGNOSIS — I10 PRIMARY HYPERTENSION: ICD-10-CM

## 2023-08-11 DIAGNOSIS — B02.9 HERPES ZOSTER WITHOUT COMPLICATION: Primary | ICD-10-CM

## 2023-08-11 PROCEDURE — 3078F PR MOST RECENT DIASTOLIC BLOOD PRESSURE < 80 MM HG: ICD-10-PCS | Mod: CPTII,,,

## 2023-08-11 PROCEDURE — 1101F PR PT FALLS ASSESS DOC 0-1 FALLS W/OUT INJ PAST YR: ICD-10-PCS | Mod: CPTII,,,

## 2023-08-11 PROCEDURE — 99214 PR OFFICE/OUTPT VISIT, EST, LEVL IV, 30-39 MIN: ICD-10-PCS | Mod: ,,,

## 2023-08-11 PROCEDURE — 3074F PR MOST RECENT SYSTOLIC BLOOD PRESSURE < 130 MM HG: ICD-10-PCS | Mod: CPTII,,,

## 2023-08-11 PROCEDURE — 3288F PR FALLS RISK ASSESSMENT DOCUMENTED: ICD-10-PCS | Mod: CPTII,,,

## 2023-08-11 PROCEDURE — 3074F SYST BP LT 130 MM HG: CPT | Mod: CPTII,,,

## 2023-08-11 PROCEDURE — 1159F PR MEDICATION LIST DOCUMENTED IN MEDICAL RECORD: ICD-10-PCS | Mod: CPTII,,,

## 2023-08-11 PROCEDURE — 1159F MED LIST DOCD IN RCRD: CPT | Mod: CPTII,,,

## 2023-08-11 PROCEDURE — 1101F PT FALLS ASSESS-DOCD LE1/YR: CPT | Mod: CPTII,,,

## 2023-08-11 PROCEDURE — 1125F AMNT PAIN NOTED PAIN PRSNT: CPT | Mod: CPTII,,,

## 2023-08-11 PROCEDURE — 3288F FALL RISK ASSESSMENT DOCD: CPT | Mod: CPTII,,,

## 2023-08-11 PROCEDURE — 3078F DIAST BP <80 MM HG: CPT | Mod: CPTII,,,

## 2023-08-11 PROCEDURE — 1125F PR PAIN SEVERITY QUANTIFIED, PAIN PRESENT: ICD-10-PCS | Mod: CPTII,,,

## 2023-08-11 PROCEDURE — 99214 OFFICE O/P EST MOD 30 MIN: CPT | Mod: ,,,

## 2023-08-11 RX ORDER — ACYCLOVIR 800 MG/1
800 TABLET ORAL
Qty: 150 TABLET | Refills: 0 | Status: SHIPPED | OUTPATIENT
Start: 2023-08-11

## 2023-08-11 NOTE — PROGRESS NOTES
Subjective:       Patient ID: Nidia Alston is a 82 y.o. female.    Chief Complaint: Rash (Right breast and around to the back . Painful rash with blisters. Began 4 days ago.)    Patient presents to the clinic with complaint of rash.    States rash started around 4 days ago.  Started on breast and has extended around to her back.  Rash is extremely painful.  She is been putting hydrocortisone cream on it.  She has had the shingles vaccine and had chickenpox as a child.    Has no other complaints or concerns today.     Patient educated on plan of care, verbalized understanding.         Review of Systems   Constitutional:  Negative for activity change, appetite change, chills, diaphoresis and fever.   HENT:  Negative for congestion, ear pain, postnasal drip, sinus pressure, sneezing and sore throat.    Eyes:  Negative for pain, discharge, redness and itching.   Respiratory:  Negative for apnea, cough, chest tightness, shortness of breath and wheezing.    Cardiovascular:  Negative for chest pain and leg swelling.   Gastrointestinal:  Negative for abdominal distention, abdominal pain, constipation, diarrhea, nausea and vomiting.   Genitourinary:  Negative for difficulty urinating, dysuria, flank pain and frequency.   Skin:  Positive for rash. Negative for color change and wound.   Neurological:  Negative for dizziness.   All other systems reviewed and are negative.      Patient Active Problem List   Diagnosis    Status of breast implant    Depressive disorder    Female genuine stress incontinence    Hypertension    GERD (gastroesophageal reflux disease)    Osteopenia of necks of both femurs    Chronic insomnia    Perennial allergic rhinitis    Degenerative disc disease, cervical    Prolonged Q-T interval on ECG    Iron deficiency anemia secondary to inadequate dietary iron intake    Syncopal episodes       Objective:      Physical Exam  Vitals and nursing note reviewed.   Constitutional:       General: She is not  "in acute distress.     Appearance: Normal appearance. She is well-developed.   HENT:      Head: Normocephalic.      Nose: Nose normal.   Eyes:      Conjunctiva/sclera: Conjunctivae normal.      Pupils: Pupils are equal, round, and reactive to light.   Cardiovascular:      Rate and Rhythm: Normal rate.   Pulmonary:      Effort: Pulmonary effort is normal. No respiratory distress.   Musculoskeletal:      Cervical back: Normal range of motion.   Skin:     General: Skin is warm and dry.      Findings: Rash present.             Comments: See picture   Neurological:      Mental Status: She is alert and oriented to person, place, and time.   Psychiatric:         Behavior: Behavior normal.           Lab Results   Component Value Date    WBC 5.96 04/18/2022    HGB 14.0 04/18/2022    HCT 44.3 04/18/2022     04/18/2022    CHOL 234 (H) 04/18/2022    TRIG 163 (H) 04/18/2022    HDL 67 04/18/2022    ALT 16 04/18/2022    AST 18 04/18/2022     04/18/2022    K 4.2 04/18/2022     04/18/2022    CREATININE 0.8 04/18/2022    BUN 16 04/18/2022    CO2 24 04/18/2022    TSH 1.830 04/27/2019    INR 1.0 01/05/2021    HGBA1C 5.0 03/27/2019     The ASCVD Risk score (Holden PERSAUD, et al., 2019) failed to calculate for the following reasons:    The 2019 ASCVD risk score is only valid for ages 40 to 79  Visit Vitals  /62 (BP Location: Left arm, Patient Position: Sitting, BP Method: Medium (Manual))   Pulse 78   Temp 96.3 °F (35.7 °C) (Temporal)   Ht 5' 1" (1.549 m)   Wt 69.3 kg (152 lb 12.5 oz)   SpO2 96%   BMI 28.87 kg/m²      Assessment:       1. Herpes zoster without complication    2. Primary hypertension        Plan:       1. Herpes zoster without complication  -     acyclovir (ZOVIRAX) 800 MG Tab; Take 1 tablet (800 mg total) by mouth 5 (five) times daily.  Dispense: 150 tablet; Refill: 0   - Stop Hydrocortisone cream   - Keep clean and dry   - Wash hands frequently    - Do not scratch area    2. Primary hypertension   - " Stable-Continue Norvasc and Losartan   - Continue current plan of care       Follow up if symptoms worsen or fail to improve.

## 2023-08-11 NOTE — PATIENT INSTRUCTIONS

## 2023-09-20 DIAGNOSIS — Z78.0 MENOPAUSE: ICD-10-CM

## 2023-12-19 DIAGNOSIS — F32.A DEPRESSIVE DISORDER: ICD-10-CM

## 2023-12-19 DIAGNOSIS — I10 ESSENTIAL HYPERTENSION: ICD-10-CM
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
## 2023-12-20 RX ORDER — CITALOPRAM 20 MG/1
20 TABLET, FILM COATED ORAL DAILY
Qty: 90 TABLET | Refills: 1 | Status: SHIPPED | OUTPATIENT
Start: 2023-12-20 | End: 2024-01-22 | Stop reason: SDUPTHER

## 2023-12-20 RX ORDER — AMLODIPINE BESYLATE 5 MG/1
5 TABLET ORAL DAILY
Qty: 90 TABLET | Refills: 1 | Status: SHIPPED | OUTPATIENT
Start: 2023-12-20 | End: 2024-01-22 | Stop reason: SDUPTHER

## 2023-12-20 RX ORDER — LOSARTAN POTASSIUM 50 MG/1
TABLET ORAL
Qty: 90 TABLET | Refills: 1 | Status: SHIPPED | OUTPATIENT
Start: 2023-12-20 | End: 2024-01-22 | Stop reason: SDUPTHER

## 2024-01-19 ENCOUNTER — TELEPHONE (OUTPATIENT)
Dept: FAMILY MEDICINE | Facility: CLINIC | Age: 84
End: 2024-01-19
Payer: MEDICARE

## 2024-01-19 NOTE — TELEPHONE ENCOUNTER
----- Message from Linnea Rodriguez sent at 1/19/2024  8:46 AM CST -----  Regarding: refill  Contact: pt  Type:  RX Refill Request    Who Called: pt  Refill or New Rx:refill  RX Name and Strength: Disp Refills Start End SALOMON  losartan (COZAAR) 50 MG tablet         How is the patient currently taking it? (ex. 1XDay):1x  Is this a 30 day or 90 day RX:90  Preferred Pharmacy with phone number:  [facility] #[id number] [place] [address] AT [facility] [address] [address]  Phone: [phone] Fax: [phone]    Local or Mail Order:local  Ordering Provider:Екатерина  Would the patient rather a call back or a response via [facility]? Call back  Best Call Back Number:[phone]    Additional Information: sts she needs a refill

## 2024-01-19 NOTE — TELEPHONE ENCOUNTER
----- Message from Linnea Rodriguez sent at 1/19/2024  8:49 AM CST -----  Regarding: refill  Contact: pt  Type:  RX Refill Request    Who Called: pt  Refill or New Rx:refill  RX Name and Strength:citalopram (CELEXA) 20 MG tablet  How is the patient currently taking it? (ex. 1XDay):1x  Is this a 30 day or 90 day RX:90  Preferred Pharmacy with phone number:  [facility] #[id number] - [place] [address] AT [facility] [address] [address]  Phone: [phone] Fax: [phone]      Local or Mail Order:local  Ordering Provider:Mikki  Would the patient rather a call back or a response via [facility]? Call back  Best Call Back Number:[phone]    Additional Information: sts she needs a refill

## 2024-01-19 NOTE — TELEPHONE ENCOUNTER
----- Message from Linnea Rodriguez sent at 1/19/2024  8:47 AM CST -----  Regarding: refill  Contact: pt  Type:  RX Refill Request    Who Called: pt  Refill or New Rx:refill  RX Name and Strength:amLODIPine (NORVASC) 5 MG tablet  How is the patient currently taking it? (ex. 1XDay):1x  Is this a 30 day or 90 day RX:90  Preferred Pharmacy with phone number:  "[facility] #[id number] - [place] [address] AT [facility] [address] [address]  Phone: [phone] Fax: [phone]      Local or Mail Order:local  Ordering Provider:Екатерина  Would the patient rather a call back or a response via [facility]? Call back  Best Call Back Number:[phone]    Additional Information: sts she needs a refill

## 2024-01-19 NOTE — TELEPHONE ENCOUNTER
----- Message from Linnea Rodriguez sent at 1/19/2024  8:46 AM CST -----  Regarding: refill  Contact: pt  Type:  RX Refill Request    Who Called: pt  Refill or New Rx:refill  RX Name and Strength: Disp Refills Start End SALOMON  losartan (COZAAR) 50 MG tablet         How is the patient currently taking it? (ex. 1XDay):1x  Is this a 30 day or 90 day RX:90  Preferred Pharmacy with phone number:  [facility] #[id number] [place] [address] AT [facility] [address] [address]  Phone: [phone] Fax: [phone]    Local or Mail Order:local  Ordering Provider:Екатерина  Would the patient rather a call back or a response via [facility]? Call back  Best Call Back Number:[phone]    Additional Information: sts she needs a refill

## 2024-01-22 ENCOUNTER — OFFICE VISIT (OUTPATIENT)
Dept: FAMILY MEDICINE | Facility: CLINIC | Age: 84
End: 2024-01-22
Payer: MEDICARE

## 2024-01-22 ENCOUNTER — LAB VISIT (OUTPATIENT)
Dept: LAB | Facility: HOSPITAL | Age: 84
End: 2024-01-22
Attending: STUDENT IN AN ORGANIZED HEALTH CARE EDUCATION/TRAINING PROGRAM
Payer: MEDICARE

## 2024-01-22 VITALS
OXYGEN SATURATION: 97 % | WEIGHT: 144.5 LBS | DIASTOLIC BLOOD PRESSURE: 60 MMHG | RESPIRATION RATE: 18 BRPM | HEART RATE: 71 BPM | BODY MASS INDEX: 27.31 KG/M2 | SYSTOLIC BLOOD PRESSURE: 122 MMHG

## 2024-01-22 DIAGNOSIS — D50.8 IRON DEFICIENCY ANEMIA SECONDARY TO INADEQUATE DIETARY IRON INTAKE: Chronic | ICD-10-CM

## 2024-01-22 DIAGNOSIS — M85.851 OSTEOPENIA OF NECKS OF BOTH FEMURS: Chronic | ICD-10-CM

## 2024-01-22 DIAGNOSIS — M81.0 POSTMENOPAUSAL BONE LOSS: ICD-10-CM

## 2024-01-22 DIAGNOSIS — R79.9 ABNORMAL FINDING OF BLOOD CHEMISTRY, UNSPECIFIED: ICD-10-CM

## 2024-01-22 DIAGNOSIS — F33.2 SEVERE EPISODE OF RECURRENT MAJOR DEPRESSIVE DISORDER, WITHOUT PSYCHOTIC FEATURES: Chronic | ICD-10-CM

## 2024-01-22 DIAGNOSIS — M85.852 OSTEOPENIA OF NECKS OF BOTH FEMURS: Chronic | ICD-10-CM

## 2024-01-22 DIAGNOSIS — M62.838 MUSCLE SPASM: ICD-10-CM

## 2024-01-22 DIAGNOSIS — Z13.6 SCREENING FOR CARDIOVASCULAR CONDITION: ICD-10-CM

## 2024-01-22 DIAGNOSIS — Z13.1 ENCOUNTER FOR SCREENING FOR DIABETES MELLITUS: ICD-10-CM

## 2024-01-22 DIAGNOSIS — Z13.220 ENCOUNTER FOR SCREENING FOR LIPID DISORDER: ICD-10-CM

## 2024-01-22 DIAGNOSIS — I10 ESSENTIAL HYPERTENSION: Chronic | ICD-10-CM

## 2024-01-22 DIAGNOSIS — Z00.00 PREVENTATIVE HEALTH CARE: Primary | ICD-10-CM

## 2024-01-22 DIAGNOSIS — F43.0 ACUTE STRESS REACTION: ICD-10-CM

## 2024-01-22 PROBLEM — R05.8 PRODUCTIVE COUGH: Status: RESOLVED | Noted: 2019-04-26 | Resolved: 2024-01-22

## 2024-01-22 PROBLEM — R55 SYNCOPE: Status: RESOLVED | Noted: 2017-04-28 | Resolved: 2024-01-22

## 2024-01-22 PROBLEM — Z78.0 POSTMENOPAUSAL: Status: RESOLVED | Noted: 2018-05-18 | Resolved: 2024-01-22

## 2024-01-22 PROBLEM — J30.89 PERENNIAL ALLERGIC RHINITIS: Chronic | Status: ACTIVE | Noted: 2017-03-08

## 2024-01-22 PROBLEM — R55 CONVULSIVE SYNCOPE: Status: RESOLVED | Noted: 2019-04-28 | Resolved: 2024-01-22

## 2024-01-22 PROBLEM — I95.1 ORTHOSTASIS: Status: RESOLVED | Noted: 2017-04-28 | Resolved: 2024-01-22

## 2024-01-22 PROBLEM — R55 SYNCOPAL EPISODES: Chronic | Status: ACTIVE | Noted: 2019-04-26

## 2024-01-22 PROBLEM — R00.1 BRADYCARDIA, SINUS: Status: RESOLVED | Noted: 2017-04-29 | Resolved: 2024-01-22

## 2024-01-22 PROBLEM — F51.01 PRIMARY INSOMNIA: Status: RESOLVED | Noted: 2019-09-06 | Resolved: 2024-01-22

## 2024-01-22 PROBLEM — R94.31 PROLONGED Q-T INTERVAL ON ECG: Status: RESOLVED | Noted: 2017-04-28 | Resolved: 2024-01-22

## 2024-01-22 LAB
BASOPHILS # BLD AUTO: 0.02 K/UL (ref 0–0.2)
BASOPHILS NFR BLD: 0.4 % (ref 0–1.9)
DIFFERENTIAL METHOD BLD: NORMAL
EOSINOPHIL # BLD AUTO: 0.1 K/UL (ref 0–0.5)
EOSINOPHIL NFR BLD: 1.7 % (ref 0–8)
ERYTHROCYTE [DISTWIDTH] IN BLOOD BY AUTOMATED COUNT: 12.8 % (ref 11.5–14.5)
HCT VFR BLD AUTO: 41.6 % (ref 37–48.5)
HGB BLD-MCNC: 13.3 G/DL (ref 12–16)
IMM GRANULOCYTES # BLD AUTO: 0.01 K/UL (ref 0–0.04)
IMM GRANULOCYTES NFR BLD AUTO: 0.2 % (ref 0–0.5)
LYMPHOCYTES # BLD AUTO: 2 K/UL (ref 1–4.8)
LYMPHOCYTES NFR BLD: 37.3 % (ref 18–48)
MCH RBC QN AUTO: 30 PG (ref 27–31)
MCHC RBC AUTO-ENTMCNC: 32 G/DL (ref 32–36)
MCV RBC AUTO: 94 FL (ref 82–98)
MONOCYTES # BLD AUTO: 0.5 K/UL (ref 0.3–1)
MONOCYTES NFR BLD: 9.8 % (ref 4–15)
NEUTROPHILS # BLD AUTO: 2.7 K/UL (ref 1.8–7.7)
NEUTROPHILS NFR BLD: 50.6 % (ref 38–73)
NRBC BLD-RTO: 0 /100 WBC
PLATELET # BLD AUTO: 327 K/UL (ref 150–450)
PMV BLD AUTO: 10.1 FL (ref 9.2–12.9)
RBC # BLD AUTO: 4.43 M/UL (ref 4–5.4)
WBC # BLD AUTO: 5.41 K/UL (ref 3.9–12.7)

## 2024-01-22 PROCEDURE — 83540 ASSAY OF IRON: CPT | Performed by: STUDENT IN AN ORGANIZED HEALTH CARE EDUCATION/TRAINING PROGRAM

## 2024-01-22 PROCEDURE — 99999 PR PBB SHADOW E&M-EST. PATIENT-LVL III: CPT | Mod: PBBFAC,,, | Performed by: STUDENT IN AN ORGANIZED HEALTH CARE EDUCATION/TRAINING PROGRAM

## 2024-01-22 PROCEDURE — 1101F PT FALLS ASSESS-DOCD LE1/YR: CPT | Mod: CPTII,S$GLB,, | Performed by: STUDENT IN AN ORGANIZED HEALTH CARE EDUCATION/TRAINING PROGRAM

## 2024-01-22 PROCEDURE — 99397 PER PM REEVAL EST PAT 65+ YR: CPT | Mod: S$GLB,,, | Performed by: STUDENT IN AN ORGANIZED HEALTH CARE EDUCATION/TRAINING PROGRAM

## 2024-01-22 PROCEDURE — 99214 OFFICE O/P EST MOD 30 MIN: CPT | Mod: 25,S$GLB,, | Performed by: STUDENT IN AN ORGANIZED HEALTH CARE EDUCATION/TRAINING PROGRAM

## 2024-01-22 PROCEDURE — 36415 COLL VENOUS BLD VENIPUNCTURE: CPT | Mod: PN | Performed by: STUDENT IN AN ORGANIZED HEALTH CARE EDUCATION/TRAINING PROGRAM

## 2024-01-22 PROCEDURE — 85025 COMPLETE CBC W/AUTO DIFF WBC: CPT | Performed by: STUDENT IN AN ORGANIZED HEALTH CARE EDUCATION/TRAINING PROGRAM

## 2024-01-22 PROCEDURE — 80053 COMPREHEN METABOLIC PANEL: CPT | Performed by: STUDENT IN AN ORGANIZED HEALTH CARE EDUCATION/TRAINING PROGRAM

## 2024-01-22 PROCEDURE — 82306 VITAMIN D 25 HYDROXY: CPT | Performed by: STUDENT IN AN ORGANIZED HEALTH CARE EDUCATION/TRAINING PROGRAM

## 2024-01-22 PROCEDURE — 3078F DIAST BP <80 MM HG: CPT | Mod: CPTII,S$GLB,, | Performed by: STUDENT IN AN ORGANIZED HEALTH CARE EDUCATION/TRAINING PROGRAM

## 2024-01-22 PROCEDURE — 1126F AMNT PAIN NOTED NONE PRSNT: CPT | Mod: CPTII,S$GLB,, | Performed by: STUDENT IN AN ORGANIZED HEALTH CARE EDUCATION/TRAINING PROGRAM

## 2024-01-22 PROCEDURE — 80061 LIPID PANEL: CPT | Performed by: STUDENT IN AN ORGANIZED HEALTH CARE EDUCATION/TRAINING PROGRAM

## 2024-01-22 PROCEDURE — 82728 ASSAY OF FERRITIN: CPT | Performed by: STUDENT IN AN ORGANIZED HEALTH CARE EDUCATION/TRAINING PROGRAM

## 2024-01-22 PROCEDURE — 3074F SYST BP LT 130 MM HG: CPT | Mod: CPTII,S$GLB,, | Performed by: STUDENT IN AN ORGANIZED HEALTH CARE EDUCATION/TRAINING PROGRAM

## 2024-01-22 PROCEDURE — 3288F FALL RISK ASSESSMENT DOCD: CPT | Mod: CPTII,S$GLB,, | Performed by: STUDENT IN AN ORGANIZED HEALTH CARE EDUCATION/TRAINING PROGRAM

## 2024-01-22 PROCEDURE — 83036 HEMOGLOBIN GLYCOSYLATED A1C: CPT | Performed by: STUDENT IN AN ORGANIZED HEALTH CARE EDUCATION/TRAINING PROGRAM

## 2024-01-22 RX ORDER — ALPRAZOLAM 0.25 MG/1
0.25 TABLET ORAL 2 TIMES DAILY PRN
Qty: 30 TABLET | Refills: 0 | Status: SHIPPED | OUTPATIENT
Start: 2024-01-22 | End: 2024-01-24 | Stop reason: SDUPTHER

## 2024-01-22 RX ORDER — LOSARTAN POTASSIUM 50 MG/1
50 TABLET ORAL DAILY
Qty: 90 TABLET | Refills: 3 | Status: SHIPPED | OUTPATIENT
Start: 2024-01-22 | End: 2024-01-25 | Stop reason: SDUPTHER

## 2024-01-22 RX ORDER — ARIPIPRAZOLE 2 MG/1
2 TABLET ORAL DAILY
Qty: 30 TABLET | Refills: 11 | Status: SHIPPED | OUTPATIENT
Start: 2024-01-22 | End: 2024-01-24 | Stop reason: SDUPTHER

## 2024-01-22 RX ORDER — AMLODIPINE BESYLATE 5 MG/1
5 TABLET ORAL DAILY
Qty: 90 TABLET | Refills: 3 | Status: SHIPPED | OUTPATIENT
Start: 2024-01-22 | End: 2024-01-25 | Stop reason: SDUPTHER

## 2024-01-22 RX ORDER — CITALOPRAM 20 MG/1
20 TABLET, FILM COATED ORAL DAILY
Qty: 90 TABLET | Refills: 3 | Status: SHIPPED | OUTPATIENT
Start: 2024-01-22 | End: 2024-01-25 | Stop reason: SDUPTHER

## 2024-01-22 RX ORDER — MELOXICAM 7.5 MG/1
7.5 TABLET ORAL DAILY
Qty: 7 TABLET | Refills: 0 | Status: SHIPPED | OUTPATIENT
Start: 2024-01-22 | End: 2024-01-25 | Stop reason: SDUPTHER
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
## 2024-01-23 LAB
25(OH)D3+25(OH)D2 SERPL-MCNC: 44 NG/ML (ref 30–96)
ALBUMIN SERPL BCP-MCNC: 4.1 G/DL (ref 3.5–5.2)
ALP SERPL-CCNC: 67 U/L (ref 55–135)
ALT SERPL W/O P-5'-P-CCNC: 21 U/L (ref 10–44)
ANION GAP SERPL CALC-SCNC: 11 MMOL/L (ref 8–16)
AST SERPL-CCNC: 24 U/L (ref 10–40)
BILIRUB SERPL-MCNC: 0.6 MG/DL (ref 0.1–1)
BUN SERPL-MCNC: 14 MG/DL (ref 8–23)
CALCIUM SERPL-MCNC: 9.6 MG/DL (ref 8.7–10.5)
CHLORIDE SERPL-SCNC: 106 MMOL/L (ref 95–110)
CHOLEST SERPL-MCNC: 173 MG/DL (ref 120–199)
CHOLEST/HDLC SERPL: 3.3 {RATIO} (ref 2–5)
CO2 SERPL-SCNC: 23 MMOL/L (ref 23–29)
CREAT SERPL-MCNC: 0.8 MG/DL (ref 0.5–1.4)
EST. GFR  (NO RACE VARIABLE): >60 ML/MIN/1.73 M^2
ESTIMATED AVG GLUCOSE: 97 MG/DL (ref 68–131)
FERRITIN SERPL-MCNC: 78 NG/ML (ref 20–300)
GLUCOSE SERPL-MCNC: 81 MG/DL (ref 70–110)
HBA1C MFR BLD: 5 % (ref 4–5.6)
HDLC SERPL-MCNC: 53 MG/DL (ref 40–75)
HDLC SERPL: 30.6 % (ref 20–50)
IRON SERPL-MCNC: 106 UG/DL (ref 30–160)
LDLC SERPL CALC-MCNC: 101.6 MG/DL (ref 63–159)
NONHDLC SERPL-MCNC: 120 MG/DL
POTASSIUM SERPL-SCNC: 4.3 MMOL/L (ref 3.5–5.1)
PROT SERPL-MCNC: 6.8 G/DL (ref 6–8.4)
SATURATED IRON: 26 % (ref 20–50)
SODIUM SERPL-SCNC: 140 MMOL/L (ref 136–145)
TOTAL IRON BINDING CAPACITY: 407 UG/DL (ref 250–450)
TRANSFERRIN SERPL-MCNC: 275 MG/DL (ref 200–375)
TRIGL SERPL-MCNC: 92 MG/DL (ref 30–150)

## 2024-01-24 ENCOUNTER — TELEPHONE (OUTPATIENT)
Dept: FAMILY MEDICINE | Facility: CLINIC | Age: 84
End: 2024-01-24
Payer: MEDICARE

## 2024-01-24 DIAGNOSIS — F43.0 ACUTE STRESS REACTION: ICD-10-CM

## 2024-01-24 DIAGNOSIS — F33.2 SEVERE EPISODE OF RECURRENT MAJOR DEPRESSIVE DISORDER, WITHOUT PSYCHOTIC FEATURES: Chronic | ICD-10-CM

## 2024-01-24 DIAGNOSIS — M62.838 MUSCLE SPASM: ICD-10-CM

## 2024-01-24 RX ORDER — ALPRAZOLAM 0.25 MG/1
0.25 TABLET ORAL 2 TIMES DAILY PRN
Qty: 30 TABLET | Refills: 0 | Status: CANCELLED | OUTPATIENT
Start: 2024-01-24 | End: 2024-02-23

## 2024-01-24 RX ORDER — ALPRAZOLAM 0.25 MG/1
0.25 TABLET ORAL 2 TIMES DAILY PRN
Qty: 30 TABLET | Refills: 0 | Status: SHIPPED | OUTPATIENT
Start: 2024-01-24 | End: 2024-02-22

## 2024-01-24 RX ORDER — ARIPIPRAZOLE 2 MG/1
2 TABLET ORAL DAILY
Qty: 30 TABLET | Refills: 11 | Status: CANCELLED | OUTPATIENT
Start: 2024-01-24 | End: 2025-01-23

## 2024-01-24 RX ORDER — ARIPIPRAZOLE 2 MG/1
2 TABLET ORAL DAILY
Qty: 30 TABLET | Refills: 11 | Status: SHIPPED | OUTPATIENT
Start: 2024-01-24 | End: 2025-01-23

## 2024-01-24 NOTE — TELEPHONE ENCOUNTER
----- Message from Luna Medeiros sent at 1/23/2024  4:56 PM CST -----  Contact: Nidia/[phone]  Type:  RX Refill Request    Who Called:  pt  Refill or New Rx:  new  RX Name and Strength:    1. ) ALPRAZolam (XANAX) 0.25 MG tablet 30 tablet 0 1/22/2024 2/21/2024   Sig - Route: Take 1 tablet (0.25 mg total) by mouth 2 (two) times daily as needed for Anxiety or Insomnia. - Oral   Sent to pharmacy as: ALPRAZolam (XANAX) 0.25 MG tablet   E-Prescribing Status: Receipt confirmed by pharmacy (1/22/2024 11:50 AM CST)      2.) Disp Refills Start End   ARIPiprazole (ABILIFY) 2 MG Tab 30 tablet 11 1/22/2024 1/21/2025   Sig - Route: Take 1 tablet (2 mg total) by mouth once daily. - Oral   Sent to pharmacy as: ARIPiprazole (ABILIFY) 2 MG Tab   E-Prescribing Status: Receipt confirmed by pharmacy (1/22/2024 11:50 AM CST)      3.)   Disp Refills Start End   citalopram (CELEXA) 20 MG tablet 90 tablet 3 1/22/2024 1/16/2025   Sig - Route: Take 1 tablet (20 mg total) by mouth once daily. - Oral   Sent to pharmacy as: citalopram (CELEXA) 20 MG tablet   E-Prescribing Status: Receipt confirmed by pharmacy (1/22/2024 11:50 AM CST)       How is the patient currently taking it? (ex. 1XDay):  see above  Is this a 30 day or 90 day RX:  see above  Preferred Pharmacy with phone number:      [facility] #[id number] - [facility] [address] [address] AT [facility] [address] [place] [address]  [address] [address] [address]  Phone: [phone] Fax: [phone]    Local or Mail Order:  local  Ordering Provider:  Dr Navarrete  Best Call Back Number:  Nidia/[phone]  Additional Information:

## 2024-01-24 NOTE — TELEPHONE ENCOUNTER
----- Message from Gloria Chinchilla sent at 1/24/2024 11:17 AM CST -----  Contact: pt  Type:  RX Refill Request    Who Called: pt    Refill or New Rx: NEW    RX Name and Strength: 3 medications    ALPRAZolam (XANAX) 0.25 MG tablet  ARIPiprazole (ABILIFY) 2 MG Tab  Ibuprofen     How is the patient currently taking it? (ex. 1XDay):as directed    Is this a 30 day or 90 day RX:30    Preferred Pharmacy with phone number:    [facility] #[id number] - [place] [id number] [address] AT [facility] [address] [address]  Phone: [phone] Fax: [phone]      Local or Mail Order:local    Ordering Provider:Landon    Would the patient rather a call back or a response via [facility]? Call back    Best Call Back Number:[phone]    Additional Information: please resend above 3 scripts to pharmacy above. They were sent to another pharm that pt no longer uses.    Thanks

## 2024-01-25 DIAGNOSIS — I10 ESSENTIAL HYPERTENSION: Chronic | ICD-10-CM

## 2024-01-25 DIAGNOSIS — M62.838 MUSCLE SPASM: ICD-10-CM

## 2024-01-25 DIAGNOSIS — F43.0 ACUTE STRESS REACTION: ICD-10-CM

## 2024-01-25 DIAGNOSIS — F33.2 SEVERE EPISODE OF RECURRENT MAJOR DEPRESSIVE DISORDER, WITHOUT PSYCHOTIC FEATURES: Chronic | ICD-10-CM

## 2024-01-25 RX ORDER — AMLODIPINE BESYLATE 5 MG/1
5 TABLET ORAL DAILY
Qty: 90 TABLET | Refills: 3 | Status: SHIPPED | OUTPATIENT
Start: 2024-01-25 | End: 2025-01-19

## 2024-01-25 RX ORDER — MELOXICAM 7.5 MG/1
7.5 TABLET ORAL DAILY
Qty: 7 TABLET | Refills: 0 | Status: SHIPPED | OUTPATIENT
Start: 2024-01-25 | End: 2024-02-01

## 2024-01-25 RX ORDER — LOSARTAN POTASSIUM 50 MG/1
50 TABLET ORAL DAILY
Qty: 90 TABLET | Refills: 3 | Status: SHIPPED | OUTPATIENT
Start: 2024-01-25 | End: 2025-01-19

## 2024-01-25 RX ORDER — CITALOPRAM 20 MG/1
20 TABLET, FILM COATED ORAL DAILY
Qty: 90 TABLET | Refills: 3 | Status: SHIPPED | OUTPATIENT
Start: 2024-01-25 | End: 2025-01-19

## 2024-01-25 NOTE — TELEPHONE ENCOUNTER
Losartan, meloxicam, amlodipine, celexa went to [facility] In [place]. Please resend [facility] [address]

## 2024-01-25 NOTE — TELEPHONE ENCOUNTER
----- Message from Gloria Chinchilla sent at 1/24/2024 12:56 PM CST -----  Contact: pt  Type:  Patient Returning Call    Who Called:pt    Who Left Message for Patient:hellen    Does the patient know what this is regarding?: refills that were sent to wrong pharmacy    Would the patient rather a call back or a response via [facility]? Call back    Best Call Back Number: [phone]    Additional Information: please call to advise  Thanks      citalopram (CELEXA) 20 MG tablet    ARIPiprazole (ABILIFY) 2 MG Tab    ALPRAZolam (XANAX) 0.25 MG tablet    Ibuprofen      Scripts need to be sent to please:    [facility] #[id number] [place] [address] AT [facility] OF [facility] [address] [address]  Phone: [phone] Fax: [phone]    Thanks

## 2024-01-25 NOTE — PROGRESS NOTES
Please contact the patient and let them know that their results were fine and do not require any change in treatment. Thanks!    Some results may be out of reference range but not clinically significant.

## 2024-01-25 NOTE — TELEPHONE ENCOUNTER
No care due was identified.  [facility] Embedded Care Due Messages. Reference number: [id number].   1/25/2024 10:59:48 AM CST

## 2024-01-26 ENCOUNTER — TELEPHONE (OUTPATIENT)
Dept: FAMILY MEDICINE | Facility: CLINIC | Age: 84
End: 2024-01-26
Payer: MEDICARE

## 2024-01-26 NOTE — TELEPHONE ENCOUNTER
----- Message from Daria Navarrete MD sent at 1/24/2024  9:56 PM CST -----  Please contact the patient and let them know that their results were fine and do not require any change in treatment. Thanks!    Some results may be out of reference range but not clinically significant.

## 2024-02-22 ENCOUNTER — OFFICE VISIT (OUTPATIENT)
Dept: FAMILY MEDICINE | Facility: CLINIC | Age: 84
End: 2024-02-22
Payer: MEDICARE

## 2024-02-22 VITALS
DIASTOLIC BLOOD PRESSURE: 70 MMHG | BODY MASS INDEX: 26.55 KG/M2 | WEIGHT: 140.63 LBS | HEIGHT: 61 IN | SYSTOLIC BLOOD PRESSURE: 138 MMHG | OXYGEN SATURATION: 96 % | HEART RATE: 90 BPM

## 2024-02-22 DIAGNOSIS — F41.9 ACUTE ANXIETY: Primary | ICD-10-CM

## 2024-02-22 DIAGNOSIS — K13.79 MOUTH PAIN: ICD-10-CM

## 2024-02-22 PROCEDURE — 3075F SYST BP GE 130 - 139MM HG: CPT | Mod: CPTII,S$GLB,, | Performed by: INTERNAL MEDICINE

## 2024-02-22 PROCEDURE — 99999 PR PBB SHADOW E&M-EST. PATIENT-LVL III: CPT | Mod: PBBFAC,,, | Performed by: INTERNAL MEDICINE

## 2024-02-22 PROCEDURE — 99214 OFFICE O/P EST MOD 30 MIN: CPT | Mod: S$GLB,,, | Performed by: INTERNAL MEDICINE

## 2024-02-22 PROCEDURE — 1125F AMNT PAIN NOTED PAIN PRSNT: CPT | Mod: CPTII,S$GLB,, | Performed by: INTERNAL MEDICINE

## 2024-02-22 PROCEDURE — 1160F RVW MEDS BY RX/DR IN RCRD: CPT | Mod: CPTII,S$GLB,, | Performed by: INTERNAL MEDICINE

## 2024-02-22 PROCEDURE — 3078F DIAST BP <80 MM HG: CPT | Mod: CPTII,S$GLB,, | Performed by: INTERNAL MEDICINE

## 2024-02-22 PROCEDURE — 1159F MED LIST DOCD IN RCRD: CPT | Mod: CPTII,S$GLB,, | Performed by: INTERNAL MEDICINE

## 2024-02-22 PROCEDURE — 3288F FALL RISK ASSESSMENT DOCD: CPT | Mod: CPTII,S$GLB,, | Performed by: INTERNAL MEDICINE

## 2024-02-22 PROCEDURE — 1101F PT FALLS ASSESS-DOCD LE1/YR: CPT | Mod: CPTII,S$GLB,, | Performed by: INTERNAL MEDICINE

## 2024-02-22 RX ORDER — DIAZEPAM 5 MG/1
5 TABLET ORAL EVERY 12 HOURS PRN
Qty: 14 TABLET | Refills: 1 | Status: SHIPPED | OUTPATIENT
Start: 2024-02-22 | End: 2024-02-29 | Stop reason: SDUPTHER

## 2024-02-22 RX ORDER — BUSPIRONE HYDROCHLORIDE 5 MG/1
5 TABLET ORAL 2 TIMES DAILY
Qty: 60 TABLET | Refills: 1 | Status: SHIPPED | OUTPATIENT
Start: 2024-02-22 | End: 2025-02-21
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
## 2024-02-29 DIAGNOSIS — F41.9 ACUTE ANXIETY: ICD-10-CM

## 2024-02-29 RX ORDER — DIAZEPAM 5 MG/1
5 TABLET ORAL EVERY 12 HOURS PRN
Qty: 14 TABLET | Refills: 1 | Status: SHIPPED | OUTPATIENT
Start: 2024-02-29 | End: 2024-03-30

## 2024-02-29 NOTE — TELEPHONE ENCOUNTER
----- Message from Emilie Graf sent at 2/29/2024  2:59 PM CST -----  Regarding: Refill  Type:  RX Refill Request    Who Called: pt   Refill or New Rx:refill     RX Name and Strength:diazePAM (VALIUM) 5 MG tablet  How is the patient currently taking it? (ex. 1XDay):as directed     Is this a 30 day or 90 day RX:90  Preferred Pharmacy with phone number:    [facility] #[id number] [place] [id number] [address] AT [facility] [address] [address]  Phone: [phone] Fax: [phone]       Local or Mail Order:local   Ordering Provider:fanny Bravo Call Back Number:[phone] (home)    Additional Information: Requesting call back once sent to the pharmacy .. St sthe rx was sent to the wrong pharmacy please advise thank you

## 2024-02-29 NOTE — TELEPHONE ENCOUNTER
----- Message from Yessenia Razo sent at 2/29/2024  9:41 AM CST -----  Contact: pt [phone]  Type: Needs Medical Advice  Who Called:  Pt     Pharmacy name and phone #:    [facility] #[id number] - [place], [place] - [address] [facility] [address] AT [facility] [address]  [address] [address] [address]  Phone: [phone] Fax: [phone]      Best Call Back Number: [phone]    Additional Information: Pt requesting her rx for diazePAM (VALIUM) 5 MG tablet Be sent to local pharmacy. Pls call back and advise

## 2024-02-29 NOTE — TELEPHONE ENCOUNTER
No care due was identified.  [facility] Embedded Care Due Messages. Reference number: [id number].   2/29/2024 3:07:39 PM CST

## 2024-09-25 DIAGNOSIS — Z78.0 MENOPAUSE: ICD-10-CM

## 2025-02-24 ENCOUNTER — PATIENT OUTREACH (OUTPATIENT)
Dept: ADMINISTRATIVE | Facility: HOSPITAL | Age: 85
End: 2025-02-24
Payer: MEDICARE

## 2025-02-24 DIAGNOSIS — Z00.00 ENCOUNTER FOR MEDICARE ANNUAL WELLNESS EXAM: ICD-10-CM

## 2025-02-24 NOTE — LETTER
February 24, 2025    Nidia Alston  [address] [place] [address]             [place] [address]  [facility] [address]  Phone: [phone] Dear MsSarah DeyGamaliel, [facility] is committed to your overall health. To help you get the most out of each of your visits, we will review your information to make sure you are up to date on all of your recommended tests and/or procedures.      Currently, we have Daria Navarrete MD  listed as your primary care provider.  You have not seen Daria Navarrete MD in the office since February 22, 2024 and are overdue for a yearly office visit.    If Daria Navarrete MD is no longer your primary care provider, please contact me so that we may update our records accordingly.      Daria Navarrete MD has found that your chart shows you may be due for:       Health Maintenance Due   Topic    RSV Vaccine (Age 60+ and Pregnant patients) (1 - 1-dose 75+ series)    DEXA Scan     TETANUS VACCINE     COVID-19 Vaccine (1 - 2024-25 season)        If you have had any of the above done at another facility, please bring the records or information to your appointment so that your record at [facility] will be complete.     Please schedule an appointment with Daria Navarrete MD at your earliest convenience by calling [phone] or going to [email or web address].    We appreciate the opportunity to provide you with excellent medical care.    Daria Navarrete MD and your [facility] Primary [facility] Team